# Patient Record
Sex: FEMALE | Race: WHITE | NOT HISPANIC OR LATINO | Employment: FULL TIME | URBAN - METROPOLITAN AREA
[De-identification: names, ages, dates, MRNs, and addresses within clinical notes are randomized per-mention and may not be internally consistent; named-entity substitution may affect disease eponyms.]

---

## 2020-09-02 ENCOUNTER — HISTORICAL (OUTPATIENT)
Dept: RADIOLOGY | Facility: HOSPITAL | Age: 30
End: 2020-09-02

## 2021-02-17 LAB
HUMAN PAPILLOMAVIRUS (HPV): NORMAL
PAP RECOMMENDATION EXT: NORMAL
PAP SMEAR: NORMAL

## 2021-11-01 ENCOUNTER — HISTORICAL (OUTPATIENT)
Dept: LAB | Facility: HOSPITAL | Age: 31
End: 2021-11-01

## 2022-02-07 LAB — C TRACH DNA SPEC QL NAA+PROBE: NEGATIVE

## 2022-04-07 ENCOUNTER — HISTORICAL (OUTPATIENT)
Dept: ADMINISTRATIVE | Facility: HOSPITAL | Age: 32
End: 2022-04-07
Payer: COMMERCIAL

## 2022-04-23 VITALS
SYSTOLIC BLOOD PRESSURE: 105 MMHG | OXYGEN SATURATION: 99 % | HEIGHT: 60 IN | WEIGHT: 111.75 LBS | DIASTOLIC BLOOD PRESSURE: 71 MMHG | BODY MASS INDEX: 21.94 KG/M2

## 2022-05-24 DIAGNOSIS — F50.9 EATING DISORDER, UNSPECIFIED TYPE: ICD-10-CM

## 2022-05-24 DIAGNOSIS — F33.9 RECURRENT MAJOR DEPRESSIVE DISORDER, REMISSION STATUS UNSPECIFIED: ICD-10-CM

## 2022-05-24 DIAGNOSIS — Z00.00 WELLNESS EXAMINATION: Primary | ICD-10-CM

## 2022-05-24 RX ORDER — SERTRALINE HYDROCHLORIDE 100 MG/1
200 TABLET, FILM COATED ORAL DAILY
COMMUNITY
Start: 2022-05-10 | End: 2022-06-13

## 2022-05-24 RX ORDER — TRAZODONE HYDROCHLORIDE 50 MG/1
50 TABLET ORAL NIGHTLY
COMMUNITY
Start: 2022-02-03 | End: 2022-06-01

## 2022-05-24 RX ORDER — METOPROLOL SUCCINATE 25 MG/1
25 TABLET, EXTENDED RELEASE ORAL EVERY MORNING
COMMUNITY
Start: 2022-03-16 | End: 2023-03-20 | Stop reason: SDUPTHER

## 2022-05-24 RX ORDER — VALACYCLOVIR HYDROCHLORIDE 1 G/1
1000 TABLET, FILM COATED ORAL 2 TIMES DAILY
COMMUNITY
Start: 2022-04-04 | End: 2022-06-01

## 2022-05-24 RX ORDER — LIDOCAINE 50 MG/G
OINTMENT TOPICAL 4 TIMES DAILY PRN
COMMUNITY
Start: 2022-04-03 | End: 2022-06-01

## 2022-05-31 ENCOUNTER — LAB VISIT (OUTPATIENT)
Dept: LAB | Facility: HOSPITAL | Age: 32
End: 2022-05-31
Attending: FAMILY MEDICINE
Payer: COMMERCIAL

## 2022-05-31 ENCOUNTER — TELEPHONE (OUTPATIENT)
Dept: FAMILY MEDICINE | Facility: CLINIC | Age: 32
End: 2022-05-31
Payer: COMMERCIAL

## 2022-05-31 DIAGNOSIS — F50.9 EATING DISORDER, UNSPECIFIED TYPE: ICD-10-CM

## 2022-05-31 DIAGNOSIS — F33.9 RECURRENT MAJOR DEPRESSIVE DISORDER, REMISSION STATUS UNSPECIFIED: ICD-10-CM

## 2022-05-31 DIAGNOSIS — Z00.00 WELLNESS EXAMINATION: ICD-10-CM

## 2022-05-31 LAB
ALBUMIN SERPL-MCNC: 4.4 GM/DL (ref 3.5–5)
ALBUMIN/GLOB SERPL: 1.4 RATIO (ref 1.1–2)
ALP SERPL-CCNC: 60 UNIT/L (ref 40–150)
ALT SERPL-CCNC: 18 UNIT/L (ref 0–55)
APPEARANCE UR: CLEAR
AST SERPL-CCNC: 23 UNIT/L (ref 5–34)
BACTERIA #/AREA URNS AUTO: NORMAL /HPF
BASOPHILS # BLD AUTO: 0.03 X10(3)/MCL (ref 0–0.2)
BASOPHILS NFR BLD AUTO: 0.6 %
BILIRUB UR QL STRIP.AUTO: NEGATIVE MG/DL
BILIRUBIN DIRECT+TOT PNL SERPL-MCNC: 0.6 MG/DL
BUN SERPL-MCNC: 11 MG/DL (ref 7–18.7)
CALCIUM SERPL-MCNC: 9.6 MG/DL (ref 8.4–10.2)
CHLORIDE SERPL-SCNC: 101 MMOL/L (ref 98–107)
CO2 SERPL-SCNC: 30 MMOL/L (ref 22–29)
COLOR UR AUTO: YELLOW
CREAT SERPL-MCNC: 0.71 MG/DL (ref 0.55–1.02)
DEPRECATED CALCIDIOL+CALCIFEROL SERPL-MC: 36.5 NG/ML (ref 30–80)
EOSINOPHIL # BLD AUTO: 0.09 X10(3)/MCL (ref 0–0.9)
EOSINOPHIL NFR BLD AUTO: 1.8 %
ERYTHROCYTE [DISTWIDTH] IN BLOOD BY AUTOMATED COUNT: 12.6 % (ref 11.5–17)
GLOBULIN SER-MCNC: 3.1 GM/DL (ref 2.4–3.5)
GLUCOSE SERPL-MCNC: 71 MG/DL (ref 74–100)
GLUCOSE UR QL STRIP.AUTO: NEGATIVE MG/DL
HCT VFR BLD AUTO: 40.7 % (ref 37–47)
HGB BLD-MCNC: 13.1 GM/DL (ref 12–16)
IMM GRANULOCYTES # BLD AUTO: 0.01 X10(3)/MCL (ref 0–0.02)
IMM GRANULOCYTES NFR BLD AUTO: 0.2 % (ref 0–0.43)
KETONES UR QL STRIP.AUTO: NEGATIVE MG/DL
LEUKOCYTE ESTERASE UR QL STRIP.AUTO: NEGATIVE UNIT/L
LYMPHOCYTES # BLD AUTO: 1.54 X10(3)/MCL (ref 0.6–4.6)
LYMPHOCYTES NFR BLD AUTO: 31.2 %
MCH RBC QN AUTO: 31.4 PG (ref 27–31)
MCHC RBC AUTO-ENTMCNC: 32.2 MG/DL (ref 33–36)
MCV RBC AUTO: 97.6 FL (ref 80–94)
MONOCYTES # BLD AUTO: 0.3 X10(3)/MCL (ref 0.1–1.3)
MONOCYTES NFR BLD AUTO: 6.1 %
NEUTROPHILS # BLD AUTO: 3 X10(3)/MCL (ref 2.1–9.2)
NEUTROPHILS NFR BLD AUTO: 60.1 %
NITRITE UR QL STRIP.AUTO: NEGATIVE
NRBC BLD AUTO-RTO: 0 %
PH UR STRIP.AUTO: 8 [PH]
PLATELET # BLD AUTO: 271 X10(3)/MCL (ref 130–400)
PMV BLD AUTO: 9.7 FL (ref 9.4–12.4)
POTASSIUM SERPL-SCNC: 4.4 MMOL/L (ref 3.5–5.1)
PROT SERPL-MCNC: 7.5 GM/DL (ref 6.4–8.3)
PROT UR QL STRIP.AUTO: NEGATIVE MG/DL
RBC # BLD AUTO: 4.17 X10(6)/MCL (ref 4.2–5.4)
RBC #/AREA URNS AUTO: NORMAL /HPF
RBC UR QL AUTO: NEGATIVE UNIT/L
SODIUM SERPL-SCNC: 139 MMOL/L (ref 136–145)
SP GR UR STRIP.AUTO: 1.01
SQUAMOUS #/AREA URNS AUTO: NORMAL /LPF
TSH SERPL-ACNC: 0.96 UIU/ML (ref 0.35–4.94)
UROBILINOGEN UR STRIP-ACNC: 0.2 MG/DL
WBC # SPEC AUTO: 4.9 X10(3)/MCL (ref 4.5–11.5)
WBC #/AREA URNS AUTO: NORMAL /HPF

## 2022-05-31 PROCEDURE — 80053 COMPREHEN METABOLIC PANEL: CPT

## 2022-05-31 PROCEDURE — 85025 COMPLETE CBC W/AUTO DIFF WBC: CPT

## 2022-05-31 PROCEDURE — 36415 COLL VENOUS BLD VENIPUNCTURE: CPT

## 2022-05-31 PROCEDURE — 82306 VITAMIN D 25 HYDROXY: CPT

## 2022-05-31 PROCEDURE — 81001 URINALYSIS AUTO W/SCOPE: CPT

## 2022-05-31 PROCEDURE — 84443 ASSAY THYROID STIM HORMONE: CPT

## 2022-06-01 ENCOUNTER — OFFICE VISIT (OUTPATIENT)
Dept: FAMILY MEDICINE | Facility: CLINIC | Age: 32
End: 2022-06-01
Payer: COMMERCIAL

## 2022-06-01 VITALS
SYSTOLIC BLOOD PRESSURE: 108 MMHG | DIASTOLIC BLOOD PRESSURE: 71 MMHG | OXYGEN SATURATION: 99 % | HEART RATE: 88 BPM | RESPIRATION RATE: 16 BRPM | BODY MASS INDEX: 21.51 KG/M2 | HEIGHT: 60 IN | TEMPERATURE: 98 F | WEIGHT: 109.56 LBS

## 2022-06-01 DIAGNOSIS — F33.40 RECURRENT MAJOR DEPRESSIVE DISORDER, IN REMISSION: ICD-10-CM

## 2022-06-01 DIAGNOSIS — F10.20 ALCOHOLISM: ICD-10-CM

## 2022-06-01 DIAGNOSIS — F41.9 ANXIETY DISORDER, UNSPECIFIED TYPE: ICD-10-CM

## 2022-06-01 DIAGNOSIS — F51.01 PRIMARY INSOMNIA: ICD-10-CM

## 2022-06-01 DIAGNOSIS — R00.0 TACHYCARDIA: ICD-10-CM

## 2022-06-01 DIAGNOSIS — F50.01 ANOREXIA NERVOSA, RESTRICTING TYPE: ICD-10-CM

## 2022-06-01 DIAGNOSIS — Z72.0 TOBACCO USER: Primary | ICD-10-CM

## 2022-06-01 PROBLEM — G47.00 INSOMNIA: Status: ACTIVE | Noted: 2022-06-01

## 2022-06-01 PROBLEM — K21.9 GASTROESOPHAGEAL REFLUX DISEASE: Status: ACTIVE | Noted: 2022-06-01

## 2022-06-01 PROBLEM — F33.9 RECURRENT MAJOR DEPRESSIVE DISORDER: Status: ACTIVE | Noted: 2022-06-01

## 2022-06-01 PROBLEM — K21.9 GASTROESOPHAGEAL REFLUX DISEASE: Status: RESOLVED | Noted: 2022-06-01 | Resolved: 2022-06-01

## 2022-06-01 PROBLEM — F50.9 DISORDERED EATING: Status: ACTIVE | Noted: 2022-06-01

## 2022-06-01 PROCEDURE — 3008F PR BODY MASS INDEX (BMI) DOCUMENTED: ICD-10-PCS | Mod: CPTII,,, | Performed by: FAMILY MEDICINE

## 2022-06-01 PROCEDURE — 1159F MED LIST DOCD IN RCRD: CPT | Mod: CPTII,,, | Performed by: FAMILY MEDICINE

## 2022-06-01 PROCEDURE — 3074F SYST BP LT 130 MM HG: CPT | Mod: CPTII,,, | Performed by: FAMILY MEDICINE

## 2022-06-01 PROCEDURE — 1160F PR REVIEW ALL MEDS BY PRESCRIBER/CLIN PHARMACIST DOCUMENTED: ICD-10-PCS | Mod: CPTII,,, | Performed by: FAMILY MEDICINE

## 2022-06-01 PROCEDURE — 1159F PR MEDICATION LIST DOCUMENTED IN MEDICAL RECORD: ICD-10-PCS | Mod: CPTII,,, | Performed by: FAMILY MEDICINE

## 2022-06-01 PROCEDURE — 3008F BODY MASS INDEX DOCD: CPT | Mod: CPTII,,, | Performed by: FAMILY MEDICINE

## 2022-06-01 PROCEDURE — 99395 PREV VISIT EST AGE 18-39: CPT | Mod: ,,, | Performed by: FAMILY MEDICINE

## 2022-06-01 PROCEDURE — 3078F DIAST BP <80 MM HG: CPT | Mod: CPTII,,, | Performed by: FAMILY MEDICINE

## 2022-06-01 PROCEDURE — 3074F PR MOST RECENT SYSTOLIC BLOOD PRESSURE < 130 MM HG: ICD-10-PCS | Mod: CPTII,,, | Performed by: FAMILY MEDICINE

## 2022-06-01 PROCEDURE — 3078F PR MOST RECENT DIASTOLIC BLOOD PRESSURE < 80 MM HG: ICD-10-PCS | Mod: CPTII,,, | Performed by: FAMILY MEDICINE

## 2022-06-01 PROCEDURE — 99395 PR PREVENTIVE VISIT,EST,18-39: ICD-10-PCS | Mod: ,,, | Performed by: FAMILY MEDICINE

## 2022-06-01 PROCEDURE — 1160F RVW MEDS BY RX/DR IN RCRD: CPT | Mod: CPTII,,, | Performed by: FAMILY MEDICINE

## 2022-06-01 NOTE — PROGRESS NOTES
Windy Galarza  06/01/2022  [id number]    Jhoana Lenz MD  Patient Care Team:  Jhoana Lu MD as PCP - General (Family Medicine)  Kallie Ambrose MD as Consulting Physician (Obstetrics and Gynecology)      Chief Complaint:  Chief Complaint   Patient presents with    Annual Exam       History of Present Illness:  HPI    31 y.o. female who presents today for wellness with labs. Labs reviewed by me and were discussed with patient. All questions regarding lab results were answered. She struggles with alcoholism and drinks 6 beers 3x a week. She exercises by walking daily. She has regular menstrual cycles.     Depression and anxiety: on zoloft 100 mg daily. Denies si/hi.  I have referred to psych    Eating disorder: she is in therapy for it but I have referred to psych for eating disorder, anxiety and depression    Tachycardia: well controlled with metoprolol    Papsmear: 2/21    Review of Systems  General: denies f/c, weight loss, night sweats, decreased appetite  Eye: denies blurred vision, changes in vision  Respiratory: denies sob, wheezing, cough  Cardiovascular: denies chest pain, palpitations, edema  Gastrointestinal: denies abdominal pain, n/v, constipation, diarrhea  Integumentary: denies rashes, pruritis        Health Maintenance  Health Maintenance Topics with due status: Not Due       Topic Last Completion Date    Influenza Vaccine Not Due     Health Maintenance Due   Topic Date Due    Hepatitis C Screening  Never done    Cervical Cancer Screening  Never done    Lipid Panel  Never done    COVID-19 Vaccine (1) Never done    Pneumococcal Vaccines (Age 0-64) (1 - PCV) Never done    HIV Screening  Never done    TETANUS VACCINE  07/24/2010       Exam:  Vitals:    06/01/22 0730   BP: 108/71   Pulse: 88   Resp: 16   Temp: 97.7 °F (36.5 °C)     Weight: 49.7 kg (109 lb 9.1 oz)   Body mass index is 21.23 kg/m².      Physical Exam    Constitutional: NAD, alert, pleasant  Respiratory: CTAB, no  wheezes, rales or rhonchi. No accessory muscle use  Eyes: EOMI  Cardiovascular: RRR, No m/r/g. No JVD. No LE edema  Gastrointestinal: BS+, nontender, nondistended  Integumentary: warm, dry, intact  Psych: AA&Ox3      1. Tobacco user  Overview:  She has quit before cold turkey but accepts a referral to smoking cessation.     Assessment & Plan:  Refer for smoking cessation    Orders:  -     Ambulatory referral/consult to Smoking Cessation Program    2. Anxiety disorder, unspecified type  Overview:  On zoloft 200 mg daily but I have referred to psych. She has failed effexor and klonopin in the past    Assessment & Plan:  Continue klonopin      3. Tachycardia  Overview:  Well controlled with metoprolol    Assessment & Plan:  Continue metoprolol      4. Primary insomnia  Overview:  She practices good sleep hygiene. She has failed trazodone and vistaril.     Assessment & Plan:  Continue good sleep hygiene      5. Anorexia nervosa, restricting type  Overview:  In therapy and doing well. Eating 3 meals per day    Assessment & Plan:   Continue therapy as it seems to really be helping her      6. Recurrent major depressive disorder, in remission  Overview:  Patient is doing well on zoloft 100 mg daily. She could not tolerate the 200 mg tabs. She has failed effexor as well. I did recommend she start seeing psych but financially, she cannot afford to do so at this time. She denies si/hi.     Assessment & Plan:  Continue zoloft 100 mg daily. RTC 6 mts or sooner  Call me with any si/hi or go to ER      7. Alcoholism  Overview:  Patient is drinking heavier lately. I have advised patient that she quit drinking alcohol or at least cut back considerably. She understood    Assessment & Plan:  Alcohol cessation encouraged       Exercise for a total of 150 min per week and eat a healthy diet  Stay up to date with all cancer screening discussed in visit  Immunizations due were discussed during visit  All health maintenance was reviewed  with patient. Patient verbalized understanding. All questions were answered.   Follow up: Follow up in about 6 months (around 12/1/2022) for routine follow up.      Care Plan/Goals: Reviewed   Goals    None

## 2022-08-22 NOTE — PROGRESS NOTES
The patient location is: work  The chief complaint leading to consultation is: anxiety    Visit type: audiovisual    Face to Face time with patient: 8  11 minutes of total time spent on the encounter, which includes face to face time and non-face to face time preparing to see the patient (eg, review of tests), Obtaining and/or reviewing separately obtained history, Documenting clinical information in the electronic or other health record, Independently interpreting results (not separately reported) and communicating results to the patient/family/caregiver, or Care coordination (not separately reported).         Each patient to whom he or she provides medical services by telemedicine is:  (1) informed of the relationship between the physician and patient and the respective role of any other health care provider with respect to management of the patient; and (2) notified that he or she may decline to receive medical services by telemedicine and may withdraw from such care at any time.       Patient ID: [id number]     Chief Complaint: Anxiety        HPI:     Windy Galarza is a 31 y.o. female addressed via telemed for anxiety, panic attacks  Is on zoloft 100mg for anxiety but takes klonopin prn panic attacks but rx for klonopin sent one year ago is almost out. Discussed that we do not prescribe klonopin for chronic use, prn only. She voices understanding       Depression and anxiety: on zoloft 100 mg daily. Denies si/hi.       Eating disorder: she is in therapy for it but I have referred to psych for eating disorder, anxiety and depression     Tachycardia: well controlled with metoprolol     Papsmear: 2/21       6. Recurrent major depressive disorder, in remission  Overview:  Patient is doing well on zoloft 100 mg daily. She could not tolerate the 200 mg tabs. She has failed effexor as well. I did recommend she start seeing psych but financially, she cannot afford to do so at this time. She denies si/hi.     cvbs biswas      ----------------------------  Anxiety disorder, unspecified  Eating disorder, unspecified  GERD (gastroesophageal reflux disease)  IBS (irritable bowel syndrome)  Insomnia  Major depression, recurrent  Tachycardia     Past Surgical History:   Procedure Laterality Date    DILATION AND CURETTAGE OF UTERUS  01/28/2021    WISDOM TOOTH EXTRACTION  2016       Review of patient's allergies indicates:  No Known Allergies    Outpatient Medications Marked as Taking for the 8/23/22 encounter (Clinical Support) with NURSE PRACTITIONER, [facility]   Medication Sig Dispense Refill    metoprolol succinate (TOPROL-XL) 25 MG 24 hr tablet Take 25 mg by mouth every morning.      sertraline (ZOLOFT) 100 MG tablet TAKE 2 TABLETS BY MOUTH EVERY DAY 60 tablet 2    traZODone (DESYREL) 50 MG tablet Take 50 mg by mouth.      valACYclovir (VALTREX) 1000 MG tablet Take 1,000 mg by mouth once daily.         Social History     Socioeconomic History    Marital status: Single   Occupational History    Occupation:  - [employer]   Tobacco Use    Smoking status: Current Some Day Smoker     Packs/day: 0.25     Types: Cigarettes    Smokeless tobacco: Never Used   Substance and Sexual Activity    Alcohol use: Not Currently    Drug use: Never    Sexual activity: Not Currently     Partners: Male     Birth control/protection: Abstinence        Family History   Problem Relation Age of Onset    Heart disease Mother     Diabetes Mellitus Mother     Hypertension Mother     Tuberculosis Mother     Alcohol abuse Mother     Depression Mother     Mental illness Mother     Miscarriages / Stillbirths Mother     Thyroid disease Sister     Alcohol abuse Sister     Depression Sister     Heart attack Maternal Grandmother     Hypertension Maternal Grandmother     Stroke Maternal Grandmother     Coronary artery disease Maternal Grandmother     Alcohol abuse Maternal Grandmother     Cancer Maternal Grandmother      "Depression Maternal Grandmother     Heart disease Maternal Grandfather     Hypertension Maternal Grandfather     Alcohol abuse Maternal Grandfather     Depression Maternal Grandfather     Hypertension Paternal Grandmother     Alcohol abuse Paternal Grandmother     Depression Paternal Grandmother     Heart disease Paternal Grandmother     Dementia Paternal Grandfather     Hypertension Paternal Grandfather     Depression Paternal Grandfather     Hearing loss Paternal Grandfather     Vision loss Paternal Grandfather     Alcohol abuse Father     Depression Father         Subjective:     ROS      See HPI for details    Constitutional: Denies Change in appetite. Denies Chills. Denies Fever. Denies Night sweats.  Psychiatric: Denies Depression. Denies Anxiety. Denies Suicidal/Homicidal ideations.    All Other ROS: Negative except as stated in HPI.       Objective:     Ht 5' 0.24" (1.53 m)   Wt 51.7 kg (114 lb)   LMP 08/18/2022 (Exact Date)   BMI 22.09 kg/m²     Physical Exam    General: Alert and oriented, No acute distress.  Head: Normocephalic,  Psychiatric: Normal interaction,  Appropriate affect         Assessment:       ICD-10-CM ICD-9-CM   1. HERVE (generalized anxiety disorder)  F41.1 300.02   2. Panic attacks  F41.0 300.01        Plan:     1. HERVE (generalized anxiety disorder)    2. Panic attacks    Continue zoloft, klonopin prn  If having to take klonopin more than twice a week then she is to call us about changing zoloft to something else for better control of anxiety       Medication List with Changes/Refills   New Medications    CLONAZEPAM (KLONOPIN) 1 MG TABLET    Take 1 tablet (1 mg total) by mouth 2 (two) times daily as needed for Anxiety.       Start Date: 8/23/2022 End Date: 8/23/2023   Current Medications    METOPROLOL SUCCINATE (TOPROL-XL) 25 MG 24 HR TABLET    Take 25 mg by mouth every morning.       Start Date: 3/16/2022 End Date: --    SERTRALINE (ZOLOFT) 100 MG TABLET    TAKE 2 " TABLETS BY MOUTH EVERY DAY       Start Date: 6/13/2022 End Date: --    TRAZODONE (DESYREL) 50 MG TABLET    Take 50 mg by mouth.       Start Date: 12/3/2021 End Date: --    VALACYCLOVIR (VALTREX) 1000 MG TABLET    Take 1,000 mg by mouth once daily.       Start Date: 8/8/2022  End Date: --          Follow up if symptoms worsen or fail to improve.

## 2022-08-23 ENCOUNTER — CLINICAL SUPPORT (OUTPATIENT)
Dept: FAMILY MEDICINE | Facility: CLINIC | Age: 32
End: 2022-08-23
Payer: COMMERCIAL

## 2022-08-23 ENCOUNTER — DOCUMENTATION ONLY (OUTPATIENT)
Dept: FAMILY MEDICINE | Facility: CLINIC | Age: 32
End: 2022-08-23

## 2022-08-23 VITALS — BODY MASS INDEX: 22.38 KG/M2 | WEIGHT: 114 LBS | HEIGHT: 60 IN

## 2022-08-23 DIAGNOSIS — F41.0 PANIC ATTACKS: ICD-10-CM

## 2022-08-23 DIAGNOSIS — F41.1 GAD (GENERALIZED ANXIETY DISORDER): Primary | ICD-10-CM

## 2022-08-23 PROCEDURE — 99442 PR PHYSICIAN TELEPHONE EVALUATION 11-20 MIN: CPT | Mod: 95,,, | Performed by: NURSE PRACTITIONER

## 2022-08-23 PROCEDURE — 99442 PR PHYSICIAN TELEPHONE EVALUATION 11-20 MIN: ICD-10-PCS | Mod: 95,,, | Performed by: NURSE PRACTITIONER

## 2022-08-23 RX ORDER — TRAZODONE HYDROCHLORIDE 50 MG/1
50 TABLET ORAL
COMMUNITY
Start: 2021-12-03 | End: 2022-11-15

## 2022-08-23 RX ORDER — VALACYCLOVIR HYDROCHLORIDE 1 G/1
1000 TABLET, FILM COATED ORAL DAILY
COMMUNITY
Start: 2022-08-08

## 2022-08-23 RX ORDER — CLONAZEPAM 1 MG/1
1 TABLET ORAL 2 TIMES DAILY PRN
Qty: 60 TABLET | Refills: 0 | Status: SHIPPED | OUTPATIENT
Start: 2022-08-23 | End: 2022-12-27 | Stop reason: SDUPTHER

## 2022-11-15 ENCOUNTER — OFFICE VISIT (OUTPATIENT)
Dept: FAMILY MEDICINE | Facility: CLINIC | Age: 32
End: 2022-11-15
Payer: COMMERCIAL

## 2022-11-15 VITALS
HEART RATE: 90 BPM | OXYGEN SATURATION: 98 % | TEMPERATURE: 97 F | BODY MASS INDEX: 22.81 KG/M2 | HEIGHT: 60 IN | WEIGHT: 116.19 LBS | DIASTOLIC BLOOD PRESSURE: 72 MMHG | RESPIRATION RATE: 18 BRPM | SYSTOLIC BLOOD PRESSURE: 111 MMHG

## 2022-11-15 DIAGNOSIS — F41.1 GENERALIZED ANXIETY DISORDER: Primary | ICD-10-CM

## 2022-11-15 DIAGNOSIS — F33.40 RECURRENT MAJOR DEPRESSIVE DISORDER, IN REMISSION: ICD-10-CM

## 2022-11-15 PROBLEM — K58.9 IRRITABLE BOWEL SYNDROME: Status: ACTIVE | Noted: 2022-11-15

## 2022-11-15 PROCEDURE — 3008F PR BODY MASS INDEX (BMI) DOCUMENTED: ICD-10-PCS | Mod: CPTII,,, | Performed by: FAMILY MEDICINE

## 2022-11-15 PROCEDURE — 3074F PR MOST RECENT SYSTOLIC BLOOD PRESSURE < 130 MM HG: ICD-10-PCS | Mod: CPTII,,, | Performed by: FAMILY MEDICINE

## 2022-11-15 PROCEDURE — 3078F DIAST BP <80 MM HG: CPT | Mod: CPTII,,, | Performed by: FAMILY MEDICINE

## 2022-11-15 PROCEDURE — 3074F SYST BP LT 130 MM HG: CPT | Mod: CPTII,,, | Performed by: FAMILY MEDICINE

## 2022-11-15 PROCEDURE — 1159F MED LIST DOCD IN RCRD: CPT | Mod: CPTII,,, | Performed by: FAMILY MEDICINE

## 2022-11-15 PROCEDURE — 1160F RVW MEDS BY RX/DR IN RCRD: CPT | Mod: CPTII,,, | Performed by: FAMILY MEDICINE

## 2022-11-15 PROCEDURE — 1160F PR REVIEW ALL MEDS BY PRESCRIBER/CLIN PHARMACIST DOCUMENTED: ICD-10-PCS | Mod: CPTII,,, | Performed by: FAMILY MEDICINE

## 2022-11-15 PROCEDURE — 3008F BODY MASS INDEX DOCD: CPT | Mod: CPTII,,, | Performed by: FAMILY MEDICINE

## 2022-11-15 PROCEDURE — 99213 OFFICE O/P EST LOW 20 MIN: CPT | Mod: ,,, | Performed by: FAMILY MEDICINE

## 2022-11-15 PROCEDURE — 99213 PR OFFICE/OUTPT VISIT, EST, LEVL III, 20-29 MIN: ICD-10-PCS | Mod: ,,, | Performed by: FAMILY MEDICINE

## 2022-11-15 PROCEDURE — 1159F PR MEDICATION LIST DOCUMENTED IN MEDICAL RECORD: ICD-10-PCS | Mod: CPTII,,, | Performed by: FAMILY MEDICINE

## 2022-11-15 PROCEDURE — 3078F PR MOST RECENT DIASTOLIC BLOOD PRESSURE < 80 MM HG: ICD-10-PCS | Mod: CPTII,,, | Performed by: FAMILY MEDICINE

## 2022-11-15 RX ORDER — ESCITALOPRAM OXALATE 10 MG/1
10 TABLET ORAL DAILY
Qty: 30 TABLET | Refills: 11 | Status: SHIPPED | OUTPATIENT
Start: 2022-11-15 | End: 2022-12-20

## 2022-11-15 NOTE — PROGRESS NOTES
Patient ID: [id number]     Chief Complaint: Memory Loss and Dizziness        HPI:     Windy Galarza is a 31 y.o. female here today for Memory Loss and Dizziness. Has been having worsening memory loss for 3-4 months. Forgetting why she entered a room, forgetting appointments. Also experiencing lack of drive. Tried Effexor but had tremors.     Stressors at home include parents. Been going to therapy. Learning to set boundaries. Work is good and doesn't associate that as a source of stress in her life. Takes Klonopin PRN for anxiety/panic attacks.     Cut back her alcohol consumption considerably. Drinks maybe once per week. Drinks non-alcohol beer other times when socializing.         ----------------------------  Anxiety disorder, unspecified  Eating disorder, unspecified  GERD (gastroesophageal reflux disease)  IBS (irritable bowel syndrome)  Insomnia  Major depression, recurrent  Tachycardia     Past Surgical History:   Procedure Laterality Date    DILATION AND CURETTAGE OF UTERUS  01/28/2021    WISDOM TOOTH EXTRACTION  2016       Review of patient's allergies indicates:  No Known Allergies    Outpatient Medications Marked as Taking for the 11/15/22 encounter (Office Visit) with Jann Grajeda, DO   Medication Sig Dispense Refill    clonazePAM (KLONOPIN) 1 MG tablet Take 1 tablet (1 mg total) by mouth 2 (two) times daily as needed for Anxiety. 60 tablet 0    metoprolol succinate (TOPROL-XL) 25 MG 24 hr tablet Take 25 mg by mouth every morning.      valACYclovir (VALTREX) 1000 MG tablet Take 1,000 mg by mouth once daily.      [DISCONTINUED] sertraline (ZOLOFT) 100 MG tablet TAKE 2 TABLETS BY MOUTH EVERY DAY 60 tablet 0       Social History     Socioeconomic History    Marital status: Single   Occupational History    Occupation:  - [employer]   Tobacco Use    Smoking status: Some Days     Packs/day: 0.25     Types: Cigarettes    Smokeless tobacco: Never   Substance and Sexual Activity    Alcohol use:  Not Currently    Drug use: Never    Sexual activity: Not Currently     Partners: Male     Birth control/protection: Abstinence        Family History   Problem Relation Age of Onset    Heart disease Mother     Diabetes Mellitus Mother     Hypertension Mother     Tuberculosis Mother     Alcohol abuse Mother     Depression Mother     Mental illness Mother     Miscarriages / Stillbirths Mother     Thyroid disease Sister     Alcohol abuse Sister     Depression Sister     Heart attack Maternal Grandmother     Hypertension Maternal Grandmother     Stroke Maternal Grandmother     Coronary artery disease Maternal Grandmother     Alcohol abuse Maternal Grandmother     Cancer Maternal Grandmother     Depression Maternal Grandmother     Heart disease Maternal Grandfather     Hypertension Maternal Grandfather     Alcohol abuse Maternal Grandfather     Depression Maternal Grandfather     Hypertension Paternal Grandmother     Alcohol abuse Paternal Grandmother     Depression Paternal Grandmother     Heart disease Paternal Grandmother     Dementia Paternal Grandfather     Hypertension Paternal Grandfather     Depression Paternal Grandfather     Hearing loss Paternal Grandfather     Vision loss Paternal Grandfather     Alcohol abuse Father     Depression Father         Patient Care Team:  Jann Grajeda DO as PCP - General (Family Medicine)  Kallie Ambrose MD as Consulting Physician (Obstetrics and Gynecology)     Subjective:     Review of Systems   Constitutional:  Positive for diaphoresis. Negative for chills and fever.   Respiratory:  Negative for cough and shortness of breath.    Cardiovascular:  Positive for palpitations. Negative for chest pain.   Gastrointestinal:  Positive for diarrhea and heartburn. Negative for abdominal pain and constipation.   Musculoskeletal:  Negative for back pain and myalgias.   Neurological:  Positive for dizziness, tingling and headaches.   Psychiatric/Behavioral:  Positive for depression and  "memory loss. Negative for suicidal ideas. The patient is nervous/anxious and has insomnia.      See HPI for details  All Other ROS: Negative except as stated in HPI.       Objective:     /72   Pulse 90   Temp 97.2 °F (36.2 °C) (Tympanic)   Resp 18   Ht 5' 0.24" (1.53 m)   Wt 52.7 kg (116 lb 3.2 oz)   LMP 10/17/2022   SpO2 98%   BMI 22.52 kg/m²     Physical Exam  Vitals reviewed.   Constitutional:       General: She is not in acute distress.     Appearance: Normal appearance.   Cardiovascular:      Rate and Rhythm: Normal rate and regular rhythm.      Heart sounds: No murmur heard.    No friction rub. No gallop.   Pulmonary:      Effort: No respiratory distress.      Breath sounds: No wheezing, rhonchi or rales.   Skin:     General: Skin is warm and dry.      Findings: No lesion or rash.   Neurological:      General: No focal deficit present.      Mental Status: She is alert.   Psychiatric:         Mood and Affect: Mood normal.       Assessment/Plan:     1. Generalized anxiety disorder  -     EScitalopram oxalate (LEXAPRO) 10 MG tablet; Take 1 tablet (10 mg total) by mouth once daily.  Dispense: 30 tablet; Refill: 11  -Will try patient on Lexapro. Advised patient to switch to Lexapro from Zoloft at next scheduled dose. Advised her to not take both medications together.   2. Recurrent major depressive disorder, in remission       Educated patient on the risks of serotonin based medications such as serotonin modulators and SSRIs/SNRIs.   Risks discussed include but were not limited to common side effects of the specific medications, risk for worsening symptoms of depression including development of suicidal thoughts or ideations, and serotonin syndrome.   Counseled patient on expected time course of treatment plan including potential benefits of medication not becoming noticeable until up to 6 weeks from start date. Patient voiced understanding of the risks and plan.        Follow up:     Follow up in " about 4 weeks (around 12/13/2022) for Follow up. In addition to their scheduled follow up, the patient has also been instructed to follow up on as needed basis.

## 2022-12-20 ENCOUNTER — OFFICE VISIT (OUTPATIENT)
Dept: FAMILY MEDICINE | Facility: CLINIC | Age: 32
End: 2022-12-20
Payer: COMMERCIAL

## 2022-12-20 VITALS
RESPIRATION RATE: 18 BRPM | SYSTOLIC BLOOD PRESSURE: 100 MMHG | WEIGHT: 118.38 LBS | OXYGEN SATURATION: 99 % | DIASTOLIC BLOOD PRESSURE: 64 MMHG | BODY MASS INDEX: 23.24 KG/M2 | HEART RATE: 92 BPM | HEIGHT: 60 IN | TEMPERATURE: 97 F

## 2022-12-20 DIAGNOSIS — B07.9 WART OF HAND: ICD-10-CM

## 2022-12-20 DIAGNOSIS — F41.1 GENERALIZED ANXIETY DISORDER: Primary | ICD-10-CM

## 2022-12-20 PROCEDURE — 3078F DIAST BP <80 MM HG: CPT | Mod: CPTII,,, | Performed by: FAMILY MEDICINE

## 2022-12-20 PROCEDURE — 1160F PR REVIEW ALL MEDS BY PRESCRIBER/CLIN PHARMACIST DOCUMENTED: ICD-10-PCS | Mod: CPTII,,, | Performed by: FAMILY MEDICINE

## 2022-12-20 PROCEDURE — 17110 PR DESTRUCTION BENIGN LESIONS UP TO 14: ICD-10-PCS | Mod: ,,, | Performed by: FAMILY MEDICINE

## 2022-12-20 PROCEDURE — 99213 OFFICE O/P EST LOW 20 MIN: CPT | Mod: 25,,, | Performed by: FAMILY MEDICINE

## 2022-12-20 PROCEDURE — 1159F MED LIST DOCD IN RCRD: CPT | Mod: CPTII,,, | Performed by: FAMILY MEDICINE

## 2022-12-20 PROCEDURE — 3008F BODY MASS INDEX DOCD: CPT | Mod: CPTII,,, | Performed by: FAMILY MEDICINE

## 2022-12-20 PROCEDURE — 1160F RVW MEDS BY RX/DR IN RCRD: CPT | Mod: CPTII,,, | Performed by: FAMILY MEDICINE

## 2022-12-20 PROCEDURE — 3008F PR BODY MASS INDEX (BMI) DOCUMENTED: ICD-10-PCS | Mod: CPTII,,, | Performed by: FAMILY MEDICINE

## 2022-12-20 PROCEDURE — 99213 PR OFFICE/OUTPT VISIT, EST, LEVL III, 20-29 MIN: ICD-10-PCS | Mod: 25,,, | Performed by: FAMILY MEDICINE

## 2022-12-20 PROCEDURE — 3078F PR MOST RECENT DIASTOLIC BLOOD PRESSURE < 80 MM HG: ICD-10-PCS | Mod: CPTII,,, | Performed by: FAMILY MEDICINE

## 2022-12-20 PROCEDURE — 1159F PR MEDICATION LIST DOCUMENTED IN MEDICAL RECORD: ICD-10-PCS | Mod: CPTII,,, | Performed by: FAMILY MEDICINE

## 2022-12-20 PROCEDURE — 3074F PR MOST RECENT SYSTOLIC BLOOD PRESSURE < 130 MM HG: ICD-10-PCS | Mod: CPTII,,, | Performed by: FAMILY MEDICINE

## 2022-12-20 PROCEDURE — 17110 DESTRUCTION B9 LES UP TO 14: CPT | Mod: ,,, | Performed by: FAMILY MEDICINE

## 2022-12-20 PROCEDURE — 3074F SYST BP LT 130 MM HG: CPT | Mod: CPTII,,, | Performed by: FAMILY MEDICINE

## 2022-12-20 NOTE — PROGRESS NOTES
Patient ID: [id number]     Chief Complaint: Follow-up        HPI:     Windy Galarza is a 31 y.o. female here today for Follow-up for anxiety and depression. Did not tolerate the Lexapro and stopped taking it.     GAD7 is a 16.     Stopped seeing her therapist as the therapist cancelled several appointments.   Stressors continue to be mainly at home. Of note, he grandmother is very ill currently.       ----------------------------  Anxiety disorder, unspecified  Eating disorder, unspecified  GERD (gastroesophageal reflux disease)  IBS (irritable bowel syndrome)  Insomnia  Major depression, recurrent  Tachycardia     Past Surgical History:   Procedure Laterality Date    DILATION AND CURETTAGE OF UTERUS  01/28/2021    WISDOM TOOTH EXTRACTION  2016       Review of patient's allergies indicates:  No Known Allergies    Outpatient Medications Marked as Taking for the 12/20/22 encounter (Office Visit) with Jann Grajeda, DO   Medication Sig Dispense Refill    clonazePAM (KLONOPIN) 1 MG tablet Take 1 tablet (1 mg total) by mouth 2 (two) times daily as needed for Anxiety. 60 tablet 0    metoprolol succinate (TOPROL-XL) 25 MG 24 hr tablet Take 25 mg by mouth every morning.      valACYclovir (VALTREX) 1000 MG tablet Take 1,000 mg by mouth once daily.         Social History     Socioeconomic History    Marital status: Single   Occupational History    Occupation:  - [employer]   Tobacco Use    Smoking status: Some Days     Packs/day: 0.25     Types: Cigarettes    Smokeless tobacco: Never   Substance and Sexual Activity    Alcohol use: Not Currently    Drug use: Never    Sexual activity: Not Currently     Partners: Male     Birth control/protection: Abstinence        Family History   Problem Relation Age of Onset    Heart disease Mother     Diabetes Mellitus Mother     Hypertension Mother     Tuberculosis Mother     Alcohol abuse Mother     Depression Mother     Mental illness Mother     Miscarriages / Stillbirths  "Mother     Thyroid disease Sister     Alcohol abuse Sister     Depression Sister     Heart attack Maternal Grandmother     Hypertension Maternal Grandmother     Stroke Maternal Grandmother     Coronary artery disease Maternal Grandmother     Alcohol abuse Maternal Grandmother     Cancer Maternal Grandmother     Depression Maternal Grandmother     Heart disease Maternal Grandfather     Hypertension Maternal Grandfather     Alcohol abuse Maternal Grandfather     Depression Maternal Grandfather     Hypertension Paternal Grandmother     Alcohol abuse Paternal Grandmother     Depression Paternal Grandmother     Heart disease Paternal Grandmother     Dementia Paternal Grandfather     Hypertension Paternal Grandfather     Depression Paternal Grandfather     Hearing loss Paternal Grandfather     Vision loss Paternal Grandfather     Alcohol abuse Father     Depression Father         Patient Care Team:  Jann Grajeda DO as PCP - General (Family Medicine)  Kallie Ambrose MD as Consulting Physician (Obstetrics and Gynecology)     Subjective:     Review of Systems   Constitutional:  Negative for chills and fever.   Respiratory:  Negative for shortness of breath.    Cardiovascular:  Negative for chest pain.   Neurological:  Negative for dizziness and headaches.   Psychiatric/Behavioral:  Positive for depression. Negative for memory loss. The patient is nervous/anxious. The patient does not have insomnia.      See HPI for details  All Other ROS: Negative except as stated in HPI.       Objective:     /64   Pulse 92   Temp 97.3 °F (36.3 °C) (Tympanic)   Resp 18   Ht 5' 0.24" (1.53 m)   Wt 53.7 kg (118 lb 6.4 oz)   LMP 12/18/2022   SpO2 99%   BMI 22.94 kg/m²     Physical Exam  Vitals reviewed.   Constitutional:       General: She is not in acute distress.     Appearance: Normal appearance.   Cardiovascular:      Rate and Rhythm: Normal rate and regular rhythm.      Heart sounds: No murmur heard.    No friction " rub. No gallop.   Pulmonary:      Effort: No respiratory distress.      Breath sounds: No wheezing, rhonchi or rales.   Musculoskeletal:         General: No swelling, tenderness or deformity.      Right lower leg: No edema.      Left lower leg: No edema.   Skin:     General: Skin is warm and dry.      Findings: Lesion present. No rash.   Neurological:      General: No focal deficit present.      Mental Status: She is alert.   Psychiatric:         Mood and Affect: Mood normal.       Assessment/Plan:     1. Generalized anxiety disorder  -Patient declined to try any new medications at this time. Encouraged her to look for constructive methods to manage it and not destructive methods. Encouraged her to try to find a new therapist also. Will reassess in 3 months or sooner if patient needs.   2. Wart of hand      -Cryodestruction of wart on hand. Written consent obtained. Patient tolerated procedure well. Finger dressed with bandaid.     Follow up:     Follow up in about 3 months (around 3/20/2023) for Follow up. In addition to their scheduled follow up, the patient has also been instructed to follow up on as needed basis.

## 2022-12-27 DIAGNOSIS — F41.1 GENERALIZED ANXIETY DISORDER: Primary | ICD-10-CM

## 2022-12-27 RX ORDER — CLONAZEPAM 1 MG/1
1 TABLET ORAL 2 TIMES DAILY PRN
Qty: 60 TABLET | Refills: 0 | Status: SHIPPED | OUTPATIENT
Start: 2022-12-27 | End: 2023-03-20 | Stop reason: SDUPTHER

## 2023-03-20 ENCOUNTER — OFFICE VISIT (OUTPATIENT)
Dept: FAMILY MEDICINE | Facility: CLINIC | Age: 33
End: 2023-03-20
Payer: COMMERCIAL

## 2023-03-20 VITALS
SYSTOLIC BLOOD PRESSURE: 111 MMHG | OXYGEN SATURATION: 99 % | WEIGHT: 116 LBS | TEMPERATURE: 98 F | HEIGHT: 60 IN | BODY MASS INDEX: 22.78 KG/M2 | DIASTOLIC BLOOD PRESSURE: 78 MMHG | RESPIRATION RATE: 16 BRPM | HEART RATE: 71 BPM

## 2023-03-20 DIAGNOSIS — D22.9 ENLARGED SKIN MOLE: ICD-10-CM

## 2023-03-20 DIAGNOSIS — R00.0 TACHYCARDIA: ICD-10-CM

## 2023-03-20 DIAGNOSIS — F41.1 GENERALIZED ANXIETY DISORDER: Primary | ICD-10-CM

## 2023-03-20 PROCEDURE — 3074F PR MOST RECENT SYSTOLIC BLOOD PRESSURE < 130 MM HG: ICD-10-PCS | Mod: CPTII,,, | Performed by: FAMILY MEDICINE

## 2023-03-20 PROCEDURE — 3078F PR MOST RECENT DIASTOLIC BLOOD PRESSURE < 80 MM HG: ICD-10-PCS | Mod: CPTII,,, | Performed by: FAMILY MEDICINE

## 2023-03-20 PROCEDURE — 99214 OFFICE O/P EST MOD 30 MIN: CPT | Mod: ,,, | Performed by: FAMILY MEDICINE

## 2023-03-20 PROCEDURE — 1159F PR MEDICATION LIST DOCUMENTED IN MEDICAL RECORD: ICD-10-PCS | Mod: CPTII,,, | Performed by: FAMILY MEDICINE

## 2023-03-20 PROCEDURE — 99214 PR OFFICE/OUTPT VISIT, EST, LEVL IV, 30-39 MIN: ICD-10-PCS | Mod: ,,, | Performed by: FAMILY MEDICINE

## 2023-03-20 PROCEDURE — 1160F RVW MEDS BY RX/DR IN RCRD: CPT | Mod: CPTII,,, | Performed by: FAMILY MEDICINE

## 2023-03-20 PROCEDURE — 3074F SYST BP LT 130 MM HG: CPT | Mod: CPTII,,, | Performed by: FAMILY MEDICINE

## 2023-03-20 PROCEDURE — 1159F MED LIST DOCD IN RCRD: CPT | Mod: CPTII,,, | Performed by: FAMILY MEDICINE

## 2023-03-20 PROCEDURE — 3008F PR BODY MASS INDEX (BMI) DOCUMENTED: ICD-10-PCS | Mod: CPTII,,, | Performed by: FAMILY MEDICINE

## 2023-03-20 PROCEDURE — 1160F PR REVIEW ALL MEDS BY PRESCRIBER/CLIN PHARMACIST DOCUMENTED: ICD-10-PCS | Mod: CPTII,,, | Performed by: FAMILY MEDICINE

## 2023-03-20 PROCEDURE — 3078F DIAST BP <80 MM HG: CPT | Mod: CPTII,,, | Performed by: FAMILY MEDICINE

## 2023-03-20 PROCEDURE — 3008F BODY MASS INDEX DOCD: CPT | Mod: CPTII,,, | Performed by: FAMILY MEDICINE

## 2023-03-20 RX ORDER — CLONAZEPAM 1 MG/1
1 TABLET ORAL 2 TIMES DAILY PRN
Qty: 60 TABLET | Refills: 5 | Status: SHIPPED | OUTPATIENT
Start: 2023-03-20 | End: 2024-01-11

## 2023-03-20 RX ORDER — METOPROLOL SUCCINATE 25 MG/1
25 TABLET, EXTENDED RELEASE ORAL EVERY MORNING
Qty: 30 TABLET | Refills: 5 | Status: SHIPPED | OUTPATIENT
Start: 2023-03-20 | End: 2023-09-13

## 2023-03-20 RX ORDER — SERTRALINE HYDROCHLORIDE 50 MG/1
50 TABLET, FILM COATED ORAL DAILY
Qty: 30 TABLET | Refills: 11 | Status: SHIPPED | OUTPATIENT
Start: 2023-03-20 | End: 2024-01-25

## 2023-03-20 NOTE — PROGRESS NOTES
Patient ID: [id number]     Chief Complaint: Follow-up        HPI:     Windy Galarza is a 32 y.o. female here today for Follow-up. Doing well overall today.       ----------------------------  Anxiety disorder, unspecified  Eating disorder, unspecified  GERD (gastroesophageal reflux disease)  IBS (irritable bowel syndrome)  Insomnia  Major depression, recurrent  Tachycardia     Past Surgical History:   Procedure Laterality Date    DILATION AND CURETTAGE OF UTERUS  01/28/2021    WISDOM TOOTH EXTRACTION  2016       Review of patient's allergies indicates:  No Known Allergies    Outpatient Medications Marked as Taking for the 3/20/23 encounter (Office Visit) with Jann Grajeda, DO   Medication Sig Dispense Refill    valACYclovir (VALTREX) 1000 MG tablet Take 1,000 mg by mouth once daily.      [DISCONTINUED] clonazePAM (KLONOPIN) 1 MG tablet Take 1 tablet (1 mg total) by mouth 2 (two) times daily as needed for Anxiety. 60 tablet 0       Social History     Socioeconomic History    Marital status: Single   Occupational History    Occupation:  - [employer]   Tobacco Use    Smoking status: Some Days     Packs/day: 0.25     Types: Cigarettes    Smokeless tobacco: Never   Substance and Sexual Activity    Alcohol use: Not Currently    Drug use: Never    Sexual activity: Not Currently     Partners: Male     Birth control/protection: Abstinence        Family History   Problem Relation Age of Onset    Heart disease Mother     Diabetes Mellitus Mother     Hypertension Mother     Tuberculosis Mother     Alcohol abuse Mother     Depression Mother     Mental illness Mother     Miscarriages / Stillbirths Mother     Thyroid disease Sister     Alcohol abuse Sister     Depression Sister     Heart attack Maternal Grandmother     Hypertension Maternal Grandmother     Stroke Maternal Grandmother     Coronary artery disease Maternal Grandmother     Alcohol abuse Maternal Grandmother     Cancer Maternal Grandmother      "Depression Maternal Grandmother     Heart disease Maternal Grandfather     Hypertension Maternal Grandfather     Alcohol abuse Maternal Grandfather     Depression Maternal Grandfather     Hypertension Paternal Grandmother     Alcohol abuse Paternal Grandmother     Depression Paternal Grandmother     Heart disease Paternal Grandmother     Dementia Paternal Grandfather     Hypertension Paternal Grandfather     Depression Paternal Grandfather     Hearing loss Paternal Grandfather     Vision loss Paternal Grandfather     Alcohol abuse Father     Depression Father         Patient Care Team:  Jann Grajeda DO as PCP - General (Family Medicine)  Kallie Ambrose MD as Consulting Physician (Obstetrics and Gynecology)  GARO Zhou Jr., MD as Consulting Physician (Gynecology)  Yaakov Long MD as Consulting Physician (Cardiology)     Subjective:     Review of Systems   Constitutional:  Negative for chills and fever.   Respiratory:  Negative for shortness of breath.    Cardiovascular:  Positive for palpitations. Negative for chest pain.   Neurological:  Negative for dizziness and headaches.   Psychiatric/Behavioral:  The patient is nervous/anxious. The patient does not have insomnia.      See HPI for details  All Other ROS: Negative except as stated in HPI.       Objective:     /78   Pulse 71   Temp 98.1 °F (36.7 °C) (Tympanic)   Resp 16   Ht 5' 0.24" (1.53 m)   Wt 52.6 kg (116 lb)   LMP 02/18/2023   SpO2 99%   BMI 22.48 kg/m²     Physical Exam  Vitals reviewed.   Constitutional:       General: She is not in acute distress.     Appearance: Normal appearance.   Cardiovascular:      Rate and Rhythm: Normal rate and regular rhythm.      Heart sounds: No murmur heard.    No friction rub. No gallop.   Pulmonary:      Effort: No respiratory distress.      Breath sounds: No wheezing, rhonchi or rales.   Musculoskeletal:         General: No swelling, tenderness or deformity.      Right lower leg: No edema. "      Left lower leg: No edema.   Skin:     General: Skin is warm and dry.      Findings: No lesion or rash.   Neurological:      General: No focal deficit present.      Mental Status: She is alert.   Psychiatric:         Mood and Affect: Mood normal.       Assessment/Plan:     1. Generalized anxiety disorder  -     sertraline (ZOLOFT) 50 MG tablet; Take 1 tablet (50 mg total) by mouth once daily.  Dispense: 30 tablet; Refill: 11  -     clonazePAM (KLONOPIN) 1 MG tablet; Take 1 tablet (1 mg total) by mouth 2 (two) times daily as needed for Anxiety.  Dispense: 60 tablet; Refill: 5    2. Tachycardia  Overview:  Well controlled with metoprolol    Orders:  -     metoprolol succinate (TOPROL-XL) 25 MG 24 hr tablet; Take 1 tablet (25 mg total) by mouth every morning.  Dispense: 30 tablet; Refill: 5    3. Enlarged skin mole  -     Ambulatory referral/consult to Dermatology; Future; Expected date: 03/20/2023          Follow up:     Follow up in about 6 months (around 9/20/2023) for Wellness. In addition to their scheduled follow up, the patient has also been instructed to follow up on as needed basis.

## 2023-06-19 ENCOUNTER — OFFICE VISIT (OUTPATIENT)
Dept: FAMILY MEDICINE | Facility: CLINIC | Age: 33
End: 2023-06-19
Payer: COMMERCIAL

## 2023-06-19 VITALS
DIASTOLIC BLOOD PRESSURE: 76 MMHG | HEART RATE: 76 BPM | OXYGEN SATURATION: 99 % | BODY MASS INDEX: 21.4 KG/M2 | WEIGHT: 109 LBS | SYSTOLIC BLOOD PRESSURE: 115 MMHG | HEIGHT: 60 IN | RESPIRATION RATE: 18 BRPM | TEMPERATURE: 99 F

## 2023-06-19 DIAGNOSIS — Z00.00 ROUTINE GENERAL MEDICAL EXAMINATION AT A HEALTH CARE FACILITY: Primary | ICD-10-CM

## 2023-06-19 DIAGNOSIS — Z72.0 TOBACCO USER: ICD-10-CM

## 2023-06-19 DIAGNOSIS — F41.1 GENERALIZED ANXIETY DISORDER: ICD-10-CM

## 2023-06-19 PROCEDURE — 1160F RVW MEDS BY RX/DR IN RCRD: CPT | Mod: CPTII,,, | Performed by: FAMILY MEDICINE

## 2023-06-19 PROCEDURE — 99395 PREV VISIT EST AGE 18-39: CPT | Mod: ,,, | Performed by: FAMILY MEDICINE

## 2023-06-19 PROCEDURE — 3008F PR BODY MASS INDEX (BMI) DOCUMENTED: ICD-10-PCS | Mod: CPTII,,, | Performed by: FAMILY MEDICINE

## 2023-06-19 PROCEDURE — 3074F SYST BP LT 130 MM HG: CPT | Mod: CPTII,,, | Performed by: FAMILY MEDICINE

## 2023-06-19 PROCEDURE — 1159F MED LIST DOCD IN RCRD: CPT | Mod: CPTII,,, | Performed by: FAMILY MEDICINE

## 2023-06-19 PROCEDURE — 3074F PR MOST RECENT SYSTOLIC BLOOD PRESSURE < 130 MM HG: ICD-10-PCS | Mod: CPTII,,, | Performed by: FAMILY MEDICINE

## 2023-06-19 PROCEDURE — 3008F BODY MASS INDEX DOCD: CPT | Mod: CPTII,,, | Performed by: FAMILY MEDICINE

## 2023-06-19 PROCEDURE — 1159F PR MEDICATION LIST DOCUMENTED IN MEDICAL RECORD: ICD-10-PCS | Mod: CPTII,,, | Performed by: FAMILY MEDICINE

## 2023-06-19 PROCEDURE — 3078F PR MOST RECENT DIASTOLIC BLOOD PRESSURE < 80 MM HG: ICD-10-PCS | Mod: CPTII,,, | Performed by: FAMILY MEDICINE

## 2023-06-19 PROCEDURE — 1160F PR REVIEW ALL MEDS BY PRESCRIBER/CLIN PHARMACIST DOCUMENTED: ICD-10-PCS | Mod: CPTII,,, | Performed by: FAMILY MEDICINE

## 2023-06-19 PROCEDURE — 99395 PR PREVENTIVE VISIT,EST,18-39: ICD-10-PCS | Mod: ,,, | Performed by: FAMILY MEDICINE

## 2023-06-19 PROCEDURE — 3078F DIAST BP <80 MM HG: CPT | Mod: CPTII,,, | Performed by: FAMILY MEDICINE

## 2023-06-19 NOTE — PROGRESS NOTES
Patient ID: [id number]     Chief Complaint: Annual Exam        HPI:     Windy Galarza is a 32 y.o. female here today for an annual wellness visit. Reviewed and discussed lab results.  Overall she feels well. No other complaints today.         ----------------------------  Anxiety disorder, unspecified  Eating disorder, unspecified  GERD (gastroesophageal reflux disease)  IBS (irritable bowel syndrome)  Insomnia  Lentigines  Major depression, recurrent  Tachycardia     Past Surgical History:   Procedure Laterality Date    DILATION AND CURETTAGE OF UTERUS  01/28/2021    WISDOM TOOTH EXTRACTION  2016       Review of patient's allergies indicates:  No Known Allergies    Outpatient Medications Marked as Taking for the 6/19/23 encounter (Office Visit) with Jann Grajeda, DO   Medication Sig Dispense Refill    clonazePAM (KLONOPIN) 1 MG tablet Take 1 tablet (1 mg total) by mouth 2 (two) times daily as needed for Anxiety. 60 tablet 5    metoprolol succinate (TOPROL-XL) 25 MG 24 hr tablet Take 1 tablet (25 mg total) by mouth every morning. 30 tablet 5    sertraline (ZOLOFT) 50 MG tablet Take 1 tablet (50 mg total) by mouth once daily. 30 tablet 11    valACYclovir (VALTREX) 1000 MG tablet Take 1,000 mg by mouth once daily.         Social History     Socioeconomic History    Marital status: Single   Occupational History    Occupation:  - [employer]   Tobacco Use    Smoking status: Some Days     Packs/day: 0.25     Types: Cigarettes    Smokeless tobacco: Never   Substance and Sexual Activity    Alcohol use: Not Currently    Drug use: Never    Sexual activity: Not Currently     Partners: Male     Birth control/protection: Abstinence     Social Determinants of Health     Financial Resource Strain: Low Risk     Difficulty of Paying Living Expenses: Not hard at all   Food Insecurity: No Food Insecurity    Worried About Running Out of Food in the Last Year: Never true    Ran Out of Food in the Last Year: Never true    Transportation Needs: No Transportation Needs    Lack of Transportation (Medical): No    Lack of Transportation (Non-Medical): No   Physical Activity: Sufficiently Active    Days of Exercise per Week: 6 days    Minutes of Exercise per Session: 30 min   Stress: Stress Concern Present    Feeling of Stress : To some extent   Social Connections: Moderately Integrated    Frequency of Communication with Friends and Family: More than three times a week    Frequency of Social Gatherings with Friends and Family: More than three times a week    Attends [employer] Services: More than 4 times per year    Active Member of Clubs or Organizations: Yes    Attends Club or Organization Meetings: 1 to 4 times per year    Marital Status: Never    Housing Stability: Low Risk     Unable to Pay for Housing in the Last Year: No    Number of Places Lived in the Last Year: 1    Unstable Housing in the Last Year: No        Family History   Problem Relation Age of Onset    Heart disease Mother     Diabetes Mellitus Mother     Hypertension Mother     Tuberculosis Mother     Alcohol abuse Mother     Depression Mother     Mental illness Mother     Miscarriages / Stillbirths Mother     Thyroid disease Sister     Alcohol abuse Sister     Depression Sister     Heart attack Maternal Grandmother     Hypertension Maternal Grandmother     Stroke Maternal Grandmother     Coronary artery disease Maternal Grandmother     Alcohol abuse Maternal Grandmother     Cancer Maternal Grandmother     Depression Maternal Grandmother     Heart disease Maternal Grandfather     Hypertension Maternal Grandfather     Alcohol abuse Maternal Grandfather     Depression Maternal Grandfather     Hypertension Paternal Grandmother     Alcohol abuse Paternal Grandmother     Depression Paternal Grandmother     Heart disease Paternal Grandmother     Dementia Paternal Grandfather     Hypertension Paternal Grandfather     Depression Paternal Grandfather     Hearing loss  "Paternal Grandfather     Vision loss Paternal Grandfather     Alcohol abuse Father     Depression Father         Patient Care Team:  Jann Grajeda DO as PCP - General (Family Medicine)  Kallie Ambrose MD as Consulting Physician (Obstetrics and Gynecology)  GARO Zhou Jr., MD as Consulting Physician (Gynecology)  Yaakov Long MD as Consulting Physician (Cardiology)  Sage Kong MD as Consulting Physician (Dermatology)       Subjective:     Review of Systems   Constitutional:  Negative for chills and fever.   Respiratory:  Negative for shortness of breath.    Cardiovascular:  Negative for chest pain.   Gastrointestinal:  Negative for constipation and diarrhea.   Neurological:  Negative for headaches.   Psychiatric/Behavioral:  The patient does not have insomnia.      See HPI for details      All Other ROS: Negative except as stated in HPI.       Objective:     /76   Pulse 76   Temp 98.6 °F (37 °C) (Tympanic)   Resp 18   Ht 5' 0.24" (1.53 m)   Wt 49.4 kg (109 lb)   LMP 06/16/2023   SpO2 99%   BMI 21.12 kg/m²     Physical Exam  Vitals reviewed.   Constitutional:       General: She is not in acute distress.     Appearance: Normal appearance.   Cardiovascular:      Rate and Rhythm: Normal rate and regular rhythm.      Heart sounds: No murmur heard.    No friction rub. No gallop.   Pulmonary:      Effort: No respiratory distress.      Breath sounds: No wheezing, rhonchi or rales.   Musculoskeletal:         General: No swelling, tenderness or deformity.      Right lower leg: No edema.      Left lower leg: No edema.   Skin:     General: Skin is warm and dry.      Findings: No lesion or rash.   Neurological:      General: No focal deficit present.      Mental Status: She is alert.   Psychiatric:         Mood and Affect: Mood normal.           Assessment:       ICD-10-CM ICD-9-CM   1. Routine general medical examination at a health care facility  Z00.00 V70.0   2. Generalized anxiety " disorder  F41.1 300.02   3. Tobacco user  Z72.0 305.1        Plan:         Health Maintenance Topics with due status: Not Due       Topic Last Completion Date    Cervical Cancer Screening 02/10/2021    Influenza Vaccine Not Due        Cervical Cancer Screening - Next due 2/10/26. Last pap with PVP cotesting on 2/10/21.     Eye Exam - Recommend annually.    Dental Exam - Recommend biannual exams.     Vaccinations -   Immunization History   Administered Date(s) Administered    Hepatitis A, Adult 10/03/2005    Hepatitis B, Pediatric/Adolescent 07/24/2000, 09/11/2000, 01/29/2001    HiB PRP-T 11/01/1990    MMR 04/05/1990, 08/19/1993    Meningococcal Conjugate (MCV4P) 03/06/2006    OPV 03/09/1989, 06/01/1989, 04/05/1990, 08/19/1993    Td (ADULT) 07/24/2000            1. Routine general medical examination at a health care facility    2. Generalized anxiety disorder  well controlled on current prescription medication    3. Tobacco user  Patient current Vapes, recommended she gradually reduced the nicotine concentration to eventually quit.       Medication List with Changes/Refills   Current Medications    CLONAZEPAM (KLONOPIN) 1 MG TABLET    Take 1 tablet (1 mg total) by mouth 2 (two) times daily as needed for Anxiety.       Start Date: 3/20/2023 End Date: 9/16/2023    METOPROLOL SUCCINATE (TOPROL-XL) 25 MG 24 HR TABLET    Take 1 tablet (25 mg total) by mouth every morning.       Start Date: 3/20/2023 End Date: 9/16/2023    SERTRALINE (ZOLOFT) 50 MG TABLET    Take 1 tablet (50 mg total) by mouth once daily.       Start Date: 3/20/2023 End Date: 3/19/2024    VALACYCLOVIR (VALTREX) 1000 MG TABLET    Take 1,000 mg by mouth once daily.       Start Date: 8/8/2022  End Date: --          The patient's Health Maintenance was reviewed and the following appears to be due at this time:   Health Maintenance Due   Topic Date Due    Hepatitis C Screening  Never done    Lipid Panel  Never done    COVID-19 Vaccine (1) Never done     Pneumococcal Vaccines (Age 0-64) (1 - PCV) Never done    TETANUS VACCINE  07/24/2010         Follow up in about 6 months (around 12/19/2023) for Follow up. In addition to their scheduled follow up, the patient has also been instructed to follow up on as needed basis.

## 2023-09-05 ENCOUNTER — OFFICE VISIT (OUTPATIENT)
Dept: FAMILY MEDICINE | Facility: CLINIC | Age: 33
End: 2023-09-05
Payer: COMMERCIAL

## 2023-09-05 VITALS
HEIGHT: 60 IN | OXYGEN SATURATION: 99 % | RESPIRATION RATE: 20 BRPM | HEART RATE: 85 BPM | SYSTOLIC BLOOD PRESSURE: 94 MMHG | BODY MASS INDEX: 21.4 KG/M2 | TEMPERATURE: 98 F | DIASTOLIC BLOOD PRESSURE: 62 MMHG | WEIGHT: 109 LBS

## 2023-09-05 DIAGNOSIS — B96.89 ACUTE BACTERIAL SINUSITIS: Primary | ICD-10-CM

## 2023-09-05 DIAGNOSIS — J01.90 ACUTE BACTERIAL SINUSITIS: Primary | ICD-10-CM

## 2023-09-05 PROCEDURE — 3074F SYST BP LT 130 MM HG: CPT | Mod: CPTII,,,

## 2023-09-05 PROCEDURE — 1159F MED LIST DOCD IN RCRD: CPT | Mod: CPTII,,,

## 2023-09-05 PROCEDURE — 1160F PR REVIEW ALL MEDS BY PRESCRIBER/CLIN PHARMACIST DOCUMENTED: ICD-10-PCS | Mod: CPTII,,,

## 2023-09-05 PROCEDURE — 3078F PR MOST RECENT DIASTOLIC BLOOD PRESSURE < 80 MM HG: ICD-10-PCS | Mod: CPTII,,,

## 2023-09-05 PROCEDURE — 3078F DIAST BP <80 MM HG: CPT | Mod: CPTII,,,

## 2023-09-05 PROCEDURE — 3008F BODY MASS INDEX DOCD: CPT | Mod: CPTII,,,

## 2023-09-05 PROCEDURE — 1159F PR MEDICATION LIST DOCUMENTED IN MEDICAL RECORD: ICD-10-PCS | Mod: CPTII,,,

## 2023-09-05 PROCEDURE — 99213 PR OFFICE/OUTPT VISIT, EST, LEVL III, 20-29 MIN: ICD-10-PCS | Mod: ,,,

## 2023-09-05 PROCEDURE — 3074F PR MOST RECENT SYSTOLIC BLOOD PRESSURE < 130 MM HG: ICD-10-PCS | Mod: CPTII,,,

## 2023-09-05 PROCEDURE — 1160F RVW MEDS BY RX/DR IN RCRD: CPT | Mod: CPTII,,,

## 2023-09-05 PROCEDURE — 99213 OFFICE O/P EST LOW 20 MIN: CPT | Mod: ,,,

## 2023-09-05 PROCEDURE — 3008F PR BODY MASS INDEX (BMI) DOCUMENTED: ICD-10-PCS | Mod: CPTII,,,

## 2023-09-05 RX ORDER — AMOXICILLIN AND CLAVULANATE POTASSIUM 875; 125 MG/1; MG/1
1 TABLET, FILM COATED ORAL EVERY 12 HOURS
Qty: 14 TABLET | Refills: 0 | Status: SHIPPED | OUTPATIENT
Start: 2023-09-05 | End: 2023-09-12

## 2023-09-05 NOTE — ASSESSMENT & PLAN NOTE
Start Augmentin 875-125 mg per tablet every 12 hours x 7 days.    Increase fluid intake.     Sore Throat: Use OTC lozenges or throat sprays, gargle with warm salt and water, warm tea with honey.  Nasal Congestion: Use OTC Mucinex D, humidifier or steamy shower.    RTC if symptoms worsen.

## 2023-09-05 NOTE — PROGRESS NOTES
Patient ID: [id number]     Chief Complaint: Cough, Sinus Problem, and Sore Throat      HPI:     Windy Galarza is a 32 y.o. female here today for a problem visit. Ms. Temple has been experiencing a cough, sinus congestion, sinus drip and sore throat x 7 days. Patient states that she has been taking OTC dayquil, with no relief. Patient denies any fever, chills, or sweats. No other complaints today.     Past Medical History:   Diagnosis Date    Anxiety disorder, unspecified     Eating disorder, unspecified     GERD (gastroesophageal reflux disease)     IBS (irritable bowel syndrome)     Insomnia     Lentigines     Major depression, recurrent     Tachycardia         Past Surgical History:   Procedure Laterality Date    DILATION AND CURETTAGE OF UTERUS  01/28/2021    WISDOM TOOTH EXTRACTION  2016        Social History     Socioeconomic History    Marital status: Single   Occupational History    Occupation:  - [employer]   Tobacco Use    Smoking status: Some Days     Current packs/day: 0.25     Types: Cigarettes    Smokeless tobacco: Never   Substance and Sexual Activity    Alcohol use: Not Currently    Drug use: Never    Sexual activity: Not Currently     Partners: Male     Birth control/protection: Abstinence     Social Determinants of Health     Financial Resource Strain: Low Risk  (6/19/2023)    Overall Financial Resource Strain (CARDIA)     Difficulty of Paying Living Expenses: Not hard at all   Food Insecurity: No Food Insecurity (6/19/2023)    Hunger Vital Sign     Worried About Running Out of Food in the Last Year: Never true     Ran Out of Food in the Last Year: Never true   Transportation Needs: No Transportation Needs (6/19/2023)    PRAPARE - Transportation     Lack of Transportation (Medical): No     Lack of Transportation (Non-Medical): No   Physical Activity: Sufficiently Active (6/19/2023)    Exercise Vital Sign     Days of Exercise per Week: 6 days     Minutes of Exercise per Session: 30 min    Stress: Stress Concern Present (6/19/2023)    [employer] - Occupational Stress Questionnaire     Feeling of Stress : To some extent   Social Connections: Moderately Integrated (6/19/2023)    Social Connection and Isolation Panel [NHANES]     Frequency of Communication with Friends and Family: More than three times a week     Frequency of Social Gatherings with Friends and Family: More than three times a week     Attends Jehovah's witness Services: More than 4 times per year     Active Member of Clubs or Organizations: Yes     Attends Club or Organization Meetings: 1 to 4 times per year     Marital Status: Never    Housing Stability: Low Risk  (6/19/2023)    Housing Stability Vital Sign     Unable to Pay for Housing in the Last Year: No     Number of Places Lived in the Last Year: 1     Unstable Housing in the Last Year: No        Current Outpatient Medications   Medication Instructions    amoxicillin-clavulanate 875-125mg (AUGMENTIN) 875-125 mg per tablet 1 tablet, Oral, Every 12 hours    clonazePAM (KLONOPIN) 1 mg, Oral, 2 times daily PRN    metoprolol succinate (TOPROL-XL) 25 mg, Oral, Every morning    sertraline (ZOLOFT) 50 mg, Oral, Daily    valACYclovir (VALTREX) 1,000 mg, Oral, Daily       Review of patient's allergies indicates:  No Known Allergies     Patient Care Team:  Jann Grajeda DO as PCP - General (Family Medicine)  Kallie Ambrose MD as Consulting Physician (Obstetrics and Gynecology)  GARO Zhou Jr., MD as Consulting Physician (Gynecology)  Yaakov Long MD as Consulting Physician (Cardiology)  Sage Kong MD as Consulting Physician (Dermatology)     Subjective:     Review of Systems    12 point review of systems conducted, negative except as stated in the history of present illness. See HPI for details.    Objective:     Visit Vitals  BP 94/62 (BP Location: Left arm, Patient Position: Sitting, BP Method: Large (Automatic))   Pulse 85    Temp 97.5 °F (36.4 °C)   Resp 20   Ht 5' (1.524 m)   Wt 49.4 kg (109 lb)   LMP 08/17/2023   SpO2 99%   BMI 21.29 kg/m²       Physical Exam  Vitals and nursing note reviewed.   Constitutional:       General: She is not in acute distress.     Appearance: Normal appearance. She is not toxic-appearing.   HENT:      Head: Normocephalic.      Right Ear: Ear canal normal.      Left Ear: Ear canal normal.      Nose: Congestion present.      Right Turbinates: Enlarged.      Left Turbinates: Enlarged.      Right Sinus: Frontal sinus tenderness present.      Left Sinus: Frontal sinus tenderness present.      Mouth/Throat:      Mouth: Mucous membranes are moist.      Pharynx: Uvula midline. Posterior oropharyngeal erythema present.      Tonsils: No tonsillar exudate.   Eyes:      Extraocular Movements: Extraocular movements intact.      Pupils: Pupils are equal, round, and reactive to light.   Cardiovascular:      Rate and Rhythm: Normal rate and regular rhythm.      Pulses: Normal pulses.   Pulmonary:      Effort: Pulmonary effort is normal. No respiratory distress.      Breath sounds: Normal breath sounds. No stridor or decreased air movement. No wheezing, rhonchi or rales.   Abdominal:      General: Abdomen is flat. Bowel sounds are normal. There is no distension.      Palpations: Abdomen is soft. There is no mass.      Tenderness: There is no abdominal tenderness.   Musculoskeletal:      Cervical back: Neck supple.   Lymphadenopathy:      Cervical: No cervical adenopathy.   Skin:     General: Skin is warm and dry.   Neurological:      General: No focal deficit present.      Mental Status: She is alert and oriented to person, place, and time.         Labs Reviewed:     Chemistry:  Lab Results   Component Value Date     05/31/2022    K 4.4 05/31/2022    CHLORIDE 101 05/31/2022    BUN 11.0 05/31/2022    CREATININE 0.71 05/31/2022    GLUCOSE 71 (L) 05/31/2022    CALCIUM 9.6 05/31/2022    ALKPHOS 60 05/31/2022    LABPROT  7.5 05/31/2022    ALBUMIN 4.4 05/31/2022    AST 23 05/31/2022    ALT 18 05/31/2022    [id number] 36.5 05/31/2022    TSH 0.9638 05/31/2022          Hematology:  Lab Results   Component Value Date    WBC 4.9 05/31/2022    HGB 13.1 05/31/2022    HCT 40.7 05/31/2022     05/31/2022       Urine:  Lab Results   Component Value Date    COLORUA Yellow 05/31/2022    APPEARANCEUA Clear 05/31/2022    SGUA 1.015 05/31/2022    PHUA 8.0 05/31/2022    PROTEINUA Negative 05/31/2022    GLUCOSEUA Negative 05/31/2022    KETONESUA Negative 05/31/2022    BLOODUA Negative 05/31/2022    NITRITESUA Negative 05/31/2022    LEUKOCYTESUR Negative 05/31/2022    RBCUA 0-2 05/31/2022    WBCUA None Seen 05/31/2022    BACTERIA Rare 05/31/2022        Assessment:       ICD-10-CM ICD-9-CM   1. Acute bacterial sinusitis  J01.90 461.9    B96.89         Plan:     1. Acute bacterial sinusitis  Assessment & Plan:  Start Augmentin 875-125 mg per tablet every 12 hours x 7 days.    Increase fluid intake.     Sore Throat: Use OTC lozenges or throat sprays, gargle with warm salt and water, warm tea with honey.  Nasal Congestion: Use OTC Mucinex D, humidifier or steamy shower.    RTC if symptoms worsen.     Orders:  -     amoxicillin-clavulanate 875-125mg (AUGMENTIN) 875-125 mg per tablet; Take 1 tablet by mouth every 12 (twelve) hours. for 7 days  Dispense: 14 tablet; Refill: 0         Follow up if symptoms worsen or fail to improve. In addition to their scheduled follow up, the patient has also been instructed to follow up on as needed basis.     Future Appointments   Date Time Provider Department Center   12/19/2023  8:30 AM Jann Grajeda DO [facility], NP

## 2023-09-05 NOTE — LETTER
September 5, 2023      [facility]  [address] [facility] [address]  Phone: [phone]       Patient: Windy Galarza   YOB: 1990  Date of Visit: 09/05/2023    To Whom It May Concern:    Charlene Galarza  was at [facility] on 09/05/2023. The patient may return to work/school on 09/06/23 with no restrictions. If you have any questions or concerns, or if I can be of further assistance, please do not hesitate to contact me.    Sincerely,    Sally Galarza LPN

## 2023-09-13 DIAGNOSIS — R00.0 TACHYCARDIA: ICD-10-CM

## 2023-09-13 RX ORDER — METOPROLOL SUCCINATE 25 MG/1
25 TABLET, EXTENDED RELEASE ORAL
Qty: 30 TABLET | Refills: 5 | Status: SHIPPED | OUTPATIENT
Start: 2023-09-13 | End: 2024-03-26

## 2023-11-22 DIAGNOSIS — L23.7 ALLERGIC DERMATITIS DUE TO POISON IVY: Primary | ICD-10-CM

## 2023-11-22 RX ORDER — METHYLPREDNISOLONE 4 MG/1
TABLET ORAL
Qty: 21 EACH | Refills: 0 | Status: SHIPPED | OUTPATIENT
Start: 2023-11-22 | End: 2024-01-11

## 2023-12-11 PROBLEM — B96.89 ACUTE BACTERIAL SINUSITIS: Status: RESOLVED | Noted: 2023-09-05 | Resolved: 2023-12-11

## 2023-12-11 PROBLEM — J01.90 ACUTE BACTERIAL SINUSITIS: Status: RESOLVED | Noted: 2023-09-05 | Resolved: 2023-12-11

## 2024-01-11 ENCOUNTER — OFFICE VISIT (OUTPATIENT)
Dept: FAMILY MEDICINE | Facility: CLINIC | Age: 34
End: 2024-01-11
Payer: COMMERCIAL

## 2024-01-11 VITALS
WEIGHT: 113.38 LBS | TEMPERATURE: 98 F | RESPIRATION RATE: 18 BRPM | BODY MASS INDEX: 22.26 KG/M2 | HEIGHT: 60 IN | OXYGEN SATURATION: 98 % | DIASTOLIC BLOOD PRESSURE: 68 MMHG | SYSTOLIC BLOOD PRESSURE: 105 MMHG | HEART RATE: 81 BPM

## 2024-01-11 DIAGNOSIS — F33.9 EPISODE OF RECURRENT MAJOR DEPRESSIVE DISORDER, UNSPECIFIED DEPRESSION EPISODE SEVERITY: ICD-10-CM

## 2024-01-11 DIAGNOSIS — F41.1 GENERALIZED ANXIETY DISORDER: Primary | ICD-10-CM

## 2024-01-11 PROCEDURE — 3008F BODY MASS INDEX DOCD: CPT | Mod: CPTII,,, | Performed by: FAMILY MEDICINE

## 2024-01-11 PROCEDURE — 99214 OFFICE O/P EST MOD 30 MIN: CPT | Mod: ,,, | Performed by: FAMILY MEDICINE

## 2024-01-11 PROCEDURE — 1159F MED LIST DOCD IN RCRD: CPT | Mod: CPTII,,, | Performed by: FAMILY MEDICINE

## 2024-01-11 PROCEDURE — 3074F SYST BP LT 130 MM HG: CPT | Mod: CPTII,,, | Performed by: FAMILY MEDICINE

## 2024-01-11 PROCEDURE — 1160F RVW MEDS BY RX/DR IN RCRD: CPT | Mod: CPTII,,, | Performed by: FAMILY MEDICINE

## 2024-01-11 PROCEDURE — 3078F DIAST BP <80 MM HG: CPT | Mod: CPTII,,, | Performed by: FAMILY MEDICINE

## 2024-01-11 NOTE — PROGRESS NOTES
Patient ID: [id number]     Chief Complaint: Follow-up        HPI:     Windy Galarza is a 33 y.o. female here today for Follow-up Has been trying to taper down her zoloft and clonazepam.     ----------------------------  Anxiety disorder, unspecified  Eating disorder, unspecified  GERD (gastroesophageal reflux disease)  IBS (irritable bowel syndrome)  Insomnia  Lentigines  Major depression, recurrent  Tachycardia     Past Surgical History:   Procedure Laterality Date    DILATION AND CURETTAGE OF UTERUS  01/28/2021    WISDOM TOOTH EXTRACTION  2016       Review of patient's allergies indicates:  No Known Allergies    Outpatient Medications Marked as Taking for the 1/11/24 encounter (Office Visit) with Jann Grajeda, DO   Medication Sig Dispense Refill    clonazePAM (KLONOPIN) 1 MG tablet Take 1 tablet (1 mg total) by mouth 2 (two) times daily as needed for Anxiety. 60 tablet 5    metoprolol succinate (TOPROL-XL) 25 MG 24 hr tablet TAKE 1 TABLET BY MOUTH EVERY DAY IN THE MORNING 30 tablet 5    sertraline (ZOLOFT) 50 MG tablet Take 1 tablet (50 mg total) by mouth once daily. 30 tablet 11    valACYclovir (VALTREX) 1000 MG tablet Take 1,000 mg by mouth once daily.         Social History     Socioeconomic History    Marital status: Single   Occupational History    Occupation:  - [employer]   Tobacco Use    Smoking status: Some Days     Types: Vaping with nicotine    Smokeless tobacco: Never   Substance and Sexual Activity    Alcohol use: Yes     Alcohol/week: 3.0 standard drinks of alcohol     Types: 3 Cans of beer per week    Drug use: Never    Sexual activity: Yes     Partners: Male     Birth control/protection: Abstinence, None     Social Determinants of Health     Financial Resource Strain: Low Risk  (1/11/2024)    Overall Financial Resource Strain (CARDIA)     Difficulty of Paying Living Expenses: Not hard at all   Recent Concern: Financial Resource Strain - High Risk (1/4/2024)    Overall Financial  Resource Strain (CARDIA)     Difficulty of Paying Living Expenses: Hard   Food Insecurity: No Food Insecurity (1/11/2024)    Hunger Vital Sign     Worried About Running Out of Food in the Last Year: Never true     Ran Out of Food in the Last Year: Never true   Recent Concern: Food Insecurity - Food Insecurity Present (1/4/2024)    Hunger Vital Sign     Worried About Running Out of Food in the Last Year: Often true     Ran Out of Food in the Last Year: Often true   Transportation Needs: No Transportation Needs (1/4/2024)    PRAPARE - Transportation     Lack of Transportation (Medical): No     Lack of Transportation (Non-Medical): No   Physical Activity: Inactive (1/4/2024)    Exercise Vital Sign     Days of Exercise per Week: 0 days     Minutes of Exercise per Session: 0 min   Stress: No Stress Concern Present (1/4/2024)    [employer] - Occupational Stress Questionnaire     Feeling of Stress : Only a little   Social Connections: Socially Isolated (1/4/2024)    Social Connection and Isolation Panel [NHANES]     Frequency of Communication with Friends and Family: Once a week     Frequency of Social Gatherings with Friends and Family: Once a week     Attends Jainism Services: More than 4 times per year     Active Member of Clubs or Organizations: No     Attends Club or Organization Meetings: Never     Marital Status: Never    Housing Stability: Low Risk  (1/4/2024)    Housing Stability Vital Sign     Unable to Pay for Housing in the Last Year: No     Number of Places Lived in the Last Year: 1     Unstable Housing in the Last Year: No        Family History   Problem Relation Age of Onset    Heart disease Mother     Diabetes Mellitus Mother     Hypertension Mother     Tuberculosis Mother     Alcohol abuse Mother     Depression Mother     Mental illness Mother     Miscarriages / Stillbirths Mother     Thyroid disease Sister     Alcohol abuse Sister     Depression Sister     Heart  "attack Maternal Grandmother     Hypertension Maternal Grandmother     Stroke Maternal Grandmother     Coronary artery disease Maternal Grandmother     Alcohol abuse Maternal Grandmother     Cancer Maternal Grandmother     Depression Maternal Grandmother     Heart disease Maternal Grandfather     Hypertension Maternal Grandfather     Alcohol abuse Maternal Grandfather     Depression Maternal Grandfather     Hypertension Paternal Grandmother     Alcohol abuse Paternal Grandmother     Depression Paternal Grandmother     Heart disease Paternal Grandmother     Dementia Paternal Grandfather     Hypertension Paternal Grandfather     Depression Paternal Grandfather     Hearing loss Paternal Grandfather     Vision loss Paternal Grandfather     Alcohol abuse Father     Depression Father         Patient Care Team:  Jann Grajeda DO as PCP - General (Family Medicine)  Kallie Ambrose MD as Consulting Physician (Obstetrics and Gynecology)  GARO Zhou Jr., MD as Consulting Physician (Gynecology)  Yaakov Long MD as Consulting Physician (Cardiology)  Sage Kong MD as Consulting Physician (Dermatology)     Subjective:     Review of Systems   Constitutional:  Negative for chills and fever.   Respiratory:  Negative for shortness of breath.    Cardiovascular:  Negative for chest pain.   Gastrointestinal:  Negative for constipation and diarrhea.   Neurological:  Negative for dizziness and headaches.   Psychiatric/Behavioral:  The patient is nervous/anxious. The patient does not have insomnia.        See HPI for details  All Other ROS: Negative except as stated in HPI.     Objective:     /68   Pulse 81   Temp 97.6 °F (36.4 °C) (Temporal)   Resp 18   Ht 4' 11.84" (1.52 m)   Wt 51.4 kg (113 lb 6.4 oz)   SpO2 98%   BMI 22.26 kg/m²     Physical Exam  Vitals reviewed.   Constitutional:       General: She is not in acute distress.     Appearance: Normal appearance.   Cardiovascular:      Rate and " Rhythm: Normal rate and regular rhythm.      Heart sounds: No murmur heard.     No friction rub. No gallop.   Pulmonary:      Effort: No respiratory distress.      Breath sounds: No wheezing, rhonchi or rales.   Musculoskeletal:         General: No swelling, tenderness or deformity.      Right lower leg: No edema.      Left lower leg: No edema.   Skin:     General: Skin is warm and dry.      Findings: No lesion or rash.   Neurological:      General: No focal deficit present.      Mental Status: She is alert.   Psychiatric:         Mood and Affect: Mood normal.         Assessment/Plan:     1. Generalized anxiety disorder  -Patient is trying to taper down her zoloft and clonazepam use.  Will try trintellix rather than restarting zoloft or clonazepam first.   2. Episode of recurrent major depressive disorder, unspecified depression episode severity    Trintellix 5mg 2 boxes take 1 tablet daily.   Lot [id number]  Exp Date Aug 2025  Follow up:     Follow up in about 3 months (around 4/11/2024) for Follow up anxiety. In addition to their scheduled follow up, the patient has also been instructed to follow up on as needed basis.

## 2024-01-25 DIAGNOSIS — F33.9 EPISODE OF RECURRENT MAJOR DEPRESSIVE DISORDER, UNSPECIFIED DEPRESSION EPISODE SEVERITY: ICD-10-CM

## 2024-01-25 DIAGNOSIS — F41.1 GENERALIZED ANXIETY DISORDER: Primary | ICD-10-CM

## 2024-01-25 RX ORDER — VORTIOXETINE 5 MG/1
5 TABLET, FILM COATED ORAL DAILY
Qty: 30 TABLET | Refills: 1 | Status: SHIPPED | OUTPATIENT
Start: 2024-01-25 | End: 2024-04-17 | Stop reason: SINTOL

## 2024-03-26 DIAGNOSIS — R00.0 TACHYCARDIA: ICD-10-CM

## 2024-03-26 RX ORDER — METOPROLOL SUCCINATE 25 MG/1
25 TABLET, EXTENDED RELEASE ORAL
Qty: 30 TABLET | Refills: 5 | Status: SHIPPED | OUTPATIENT
Start: 2024-03-26

## 2024-04-17 ENCOUNTER — OFFICE VISIT (OUTPATIENT)
Dept: FAMILY MEDICINE | Facility: CLINIC | Age: 34
End: 2024-04-17
Payer: COMMERCIAL

## 2024-04-17 VITALS
RESPIRATION RATE: 18 BRPM | TEMPERATURE: 98 F | WEIGHT: 113 LBS | BODY MASS INDEX: 22.19 KG/M2 | HEART RATE: 76 BPM | SYSTOLIC BLOOD PRESSURE: 115 MMHG | OXYGEN SATURATION: 99 % | DIASTOLIC BLOOD PRESSURE: 72 MMHG | HEIGHT: 60 IN

## 2024-04-17 DIAGNOSIS — F41.1 GENERALIZED ANXIETY DISORDER: Primary | ICD-10-CM

## 2024-04-17 DIAGNOSIS — R61 DIAPHORESIS: ICD-10-CM

## 2024-04-17 PROCEDURE — 3078F DIAST BP <80 MM HG: CPT | Mod: CPTII,,, | Performed by: FAMILY MEDICINE

## 2024-04-17 PROCEDURE — 1159F MED LIST DOCD IN RCRD: CPT | Mod: CPTII,,, | Performed by: FAMILY MEDICINE

## 2024-04-17 PROCEDURE — 3008F BODY MASS INDEX DOCD: CPT | Mod: CPTII,,, | Performed by: FAMILY MEDICINE

## 2024-04-17 PROCEDURE — 1160F RVW MEDS BY RX/DR IN RCRD: CPT | Mod: CPTII,,, | Performed by: FAMILY MEDICINE

## 2024-04-17 PROCEDURE — 99214 OFFICE O/P EST MOD 30 MIN: CPT | Mod: ,,, | Performed by: FAMILY MEDICINE

## 2024-04-17 PROCEDURE — 3074F SYST BP LT 130 MM HG: CPT | Mod: CPTII,,, | Performed by: FAMILY MEDICINE

## 2024-04-17 RX ORDER — HYDROXYZINE HYDROCHLORIDE 25 MG/1
25 TABLET, FILM COATED ORAL 4 TIMES DAILY PRN
Qty: 40 TABLET | Refills: 0 | Status: SHIPPED | OUTPATIENT
Start: 2024-04-17 | End: 2024-04-27

## 2024-04-17 NOTE — PROGRESS NOTES
Patient ID: [id number]     Chief Complaint: Follow-up    HPI:     Windy Galarza is a 33 y.o. female here today for Follow-up for anxiety. Is no longer on the clonazepam and did not tolerate the trintellix. Experiencing frequent episodes of diaphoresis.     -------------------------------------    Anxiety disorder, unspecified    Eating disorder, unspecified    GERD (gastroesophageal reflux disease)    IBS (irritable bowel syndrome)    Insomnia    Lentigines    Major depression, recurrent    Tachycardia        Past Surgical History:   Procedure Laterality Date    DILATION AND CURETTAGE OF UTERUS  01/28/2021    WISDOM TOOTH EXTRACTION  2016       Review of patient's allergies indicates:  No Known Allergies    Current Outpatient Medications   Medication Sig Dispense Refill    metoprolol succinate (TOPROL-XL) 25 MG 24 hr tablet TAKE 1 TABLET BY MOUTH EVERY DAY IN THE MORNING 30 tablet 5    valACYclovir (VALTREX) 1000 MG tablet Take 1,000 mg by mouth once daily.      hydrOXYzine HCL (ATARAX) 25 MG tablet Take 1 tablet (25 mg total) by mouth 4 (four) times daily as needed for Anxiety. 40 tablet 0     No current facility-administered medications for this visit.       Social History     Socioeconomic History    Marital status: Single   Occupational History    Occupation:  - [employer]   Tobacco Use    Smoking status: Some Days     Types: Vaping with nicotine    Smokeless tobacco: Never   Substance and Sexual Activity    Alcohol use: Yes     Alcohol/week: 3.0 standard drinks of alcohol     Types: 3 Cans of beer per week    Drug use: Never    Sexual activity: Yes     Partners: Male     Birth control/protection: Abstinence, None     Social Determinants of Health     Financial Resource Strain: Low Risk  (1/11/2024)    Overall Financial Resource Strain (CARDIA)     Difficulty of Paying Living Expenses: Not hard at all   Recent Concern: Financial Resource Strain - High Risk (1/4/2024)    Overall Financial Resource  Strain (CARDIA)     Difficulty of Paying Living Expenses: Hard   Food Insecurity: No Food Insecurity (1/11/2024)    Hunger Vital Sign     Worried About Running Out of Food in the Last Year: Never true     Ran Out of Food in the Last Year: Never true   Recent Concern: Food Insecurity - Food Insecurity Present (1/4/2024)    Hunger Vital Sign     Worried About Running Out of Food in the Last Year: Often true     Ran Out of Food in the Last Year: Often true   Transportation Needs: No Transportation Needs (1/4/2024)    PRAPARE - Transportation     Lack of Transportation (Medical): No     Lack of Transportation (Non-Medical): No   Physical Activity: Inactive (1/4/2024)    Exercise Vital Sign     Days of Exercise per Week: 0 days     Minutes of Exercise per Session: 0 min   Stress: No Stress Concern Present (1/4/2024)    [employer] [place] [employer] - Occupational Stress Questionnaire     Feeling of Stress : Only a little   Social Connections: Socially Isolated (1/4/2024)    Social Connection and Isolation Panel [NHANES]     Frequency of Communication with Friends and Family: Once a week     Frequency of Social Gatherings with Friends and Family: Once a week     Attends Episcopalian Services: More than 4 times per year     Active Member of Clubs or Organizations: No     Attends Club or Organization Meetings: Never     Marital Status: Never    Housing Stability: Low Risk  (1/4/2024)    Housing Stability Vital Sign     Unable to Pay for Housing in the Last Year: No     Number of Places Lived in the Last Year: 1     Unstable Housing in the Last Year: No        Family History   Problem Relation Name Age of Onset    Heart disease Mother Mom     Diabetes Mellitus Mother Mom     Hypertension Mother Mom     Tuberculosis Mother Mom     Alcohol abuse Mother Mom     Depression Mother Mom     Mental illness Mother Mom     Miscarriages / Stillbirths Mother Mom     Thyroid disease Sister Sister     Alcohol abuse Sister  "Sister     Depression Sister Sister     Heart attack Maternal Grandmother Maw Barras     Hypertension Maternal Grandmother Maw Barras     Stroke Maternal Grandmother Maw Barras     Coronary artery disease Maternal Grandmother Maw Barras     Alcohol abuse Maternal Grandmother Maw Barras     Cancer Maternal Grandmother Maw Barras     Depression Maternal Grandmother Maw Barras     Heart disease Maternal Grandfather Paw Barras     Hypertension Maternal Grandfather Paw Barras     Alcohol abuse Maternal Grandfather Paw Barras     Depression Maternal Grandfather Paw Barras     Hypertension Paternal Grandmother Maw Maw     Alcohol abuse Paternal Grandmother Maw Maw     Depression Paternal Grandmother Maw Maw     Heart disease Paternal Grandmother Maw Maw     Dementia Paternal Grandfather [place]     Hypertension Paternal Grandfather [place]     Depression Paternal Grandfather [place]     Hearing loss Paternal Grandfather [place]     Vision loss Paternal Grandfather [place]     Alcohol abuse Father Dad     Depression Father Dad         Patient Care Team:  Jann Grajeda DO as PCP - General (Family Medicine)  Kallie Ambrose MD as Consulting Physician (Obstetrics and Gynecology)  GARO Zhou Jr., MD as Consulting Physician (Gynecology)  Yaakov Long MD as Consulting Physician (Cardiology)  Sage Kong MD as Consulting Physician (Dermatology)     Subjective:     Review of Systems   Constitutional:  Negative for chills and fever.   Respiratory:  Negative for shortness of breath.    Cardiovascular:  Negative for chest pain.   Gastrointestinal:  Negative for constipation and diarrhea.   Neurological:  Negative for dizziness and headaches.   Psychiatric/Behavioral:  The patient does not have insomnia.        See HPI for details  All Other ROS: Negative except as stated in HPI.       Objective:     /72   Pulse 76   Temp 97.6 °F (36.4 °C) (Temporal)   Resp 18   Ht 4' 11.84" (1.52 m)   Wt 51.3 " kg (113 lb)   SpO2 99%   BMI 22.18 kg/m²     Physical Exam  Vitals reviewed.   Constitutional:       General: She is not in acute distress.     Appearance: Normal appearance.   Cardiovascular:      Rate and Rhythm: Normal rate and regular rhythm.      Heart sounds: No murmur heard.     No friction rub. No gallop.   Pulmonary:      Effort: No respiratory distress.      Breath sounds: No wheezing, rhonchi or rales.   Musculoskeletal:         General: No swelling, tenderness or deformity.      Right lower leg: No edema.      Left lower leg: No edema.   Skin:     General: Skin is warm and dry.      Findings: No lesion or rash.   Neurological:      General: No focal deficit present.      Mental Status: She is alert.   Psychiatric:         Mood and Affect: Mood normal.         Assessment/Plan:     1. Generalized anxiety disorder  -     hydrOXYzine HCL (ATARAX) 25 MG tablet; Take 1 tablet (25 mg total) by mouth 4 (four) times daily as needed for Anxiety.  Dispense: 40 tablet; Refill: 0    2. Diaphoresis  -     Comprehensive Metabolic Panel; Future; Expected date: 04/17/2024  -     Estradiol; Future; Expected date: 04/17/2024  -     Ferritin; Future; Expected date: 04/17/2024  -     Luteinizing Hormone; Future; Expected date: 04/17/2024  -     Progesterone; Future; Expected date: 04/17/2024  -     CBC Auto Differential; Future; Expected date: 04/17/2024  -     Follicle Stimulating Hormone; Future; Expected date: 04/17/2024  -     TSH; Future; Expected date: 04/17/2024        Follow up:     Follow up in about 3 months (around 7/17/2024) for Wellness. In addition to their scheduled follow up, the patient has also been instructed to follow up on as needed basis.

## 2024-04-19 ENCOUNTER — LAB VISIT (OUTPATIENT)
Dept: LAB | Facility: HOSPITAL | Age: 34
End: 2024-04-19
Attending: FAMILY MEDICINE
Payer: COMMERCIAL

## 2024-04-19 DIAGNOSIS — R61 DIAPHORESIS: ICD-10-CM

## 2024-04-19 LAB
ALBUMIN SERPL-MCNC: 4.2 G/DL (ref 3.5–5)
ALBUMIN/GLOB SERPL: 1.4 RATIO (ref 1.1–2)
ALP SERPL-CCNC: 46 UNIT/L (ref 40–150)
ALT SERPL-CCNC: 9 UNIT/L (ref 0–55)
AST SERPL-CCNC: 17 UNIT/L (ref 5–34)
BASOPHILS # BLD AUTO: 0.02 X10(3)/MCL
BASOPHILS NFR BLD AUTO: 0.3 %
BILIRUB SERPL-MCNC: 0.7 MG/DL
BUN SERPL-MCNC: 9 MG/DL (ref 7–18.7)
CALCIUM SERPL-MCNC: 9.3 MG/DL (ref 8.4–10.2)
CHLORIDE SERPL-SCNC: 106 MMOL/L (ref 98–107)
CO2 SERPL-SCNC: 26 MMOL/L (ref 22–29)
CREAT SERPL-MCNC: 0.77 MG/DL (ref 0.55–1.02)
EOSINOPHIL # BLD AUTO: 0.12 X10(3)/MCL (ref 0–0.9)
EOSINOPHIL NFR BLD AUTO: 2.1 %
ERYTHROCYTE [DISTWIDTH] IN BLOOD BY AUTOMATED COUNT: 11.6 % (ref 11.5–17)
ESTRADIOL SERPL HS-MCNC: 147 PG/ML
FERRITIN SERPL-MCNC: 30.4 NG/ML (ref 4.63–204)
FSH SERPL-ACNC: 2.5 MIU/ML
GFR SERPLBLD CREATININE-BSD FMLA CKD-EPI: >60 MLS/MIN/1.73/M2
GLOBULIN SER-MCNC: 3 GM/DL (ref 2.4–3.5)
GLUCOSE SERPL-MCNC: 85 MG/DL (ref 74–100)
HCT VFR BLD AUTO: 39.1 % (ref 37–47)
HGB BLD-MCNC: 13.3 G/DL (ref 12–16)
IMM GRANULOCYTES # BLD AUTO: 0.01 X10(3)/MCL (ref 0–0.04)
IMM GRANULOCYTES NFR BLD AUTO: 0.2 %
LH SERPL-ACNC: 3.76 MIU/ML
LYMPHOCYTES # BLD AUTO: 1.24 X10(3)/MCL (ref 0.6–4.6)
LYMPHOCYTES NFR BLD AUTO: 21.5 %
MCH RBC QN AUTO: 33 PG (ref 27–31)
MCHC RBC AUTO-ENTMCNC: 34 G/DL (ref 33–36)
MCV RBC AUTO: 97 FL (ref 80–94)
MONOCYTES # BLD AUTO: 0.35 X10(3)/MCL (ref 0.1–1.3)
MONOCYTES NFR BLD AUTO: 6.1 %
NEUTROPHILS # BLD AUTO: 4.03 X10(3)/MCL (ref 2.1–9.2)
NEUTROPHILS NFR BLD AUTO: 69.8 %
NRBC BLD AUTO-RTO: 0 %
PLATELET # BLD AUTO: 257 X10(3)/MCL (ref 130–400)
PMV BLD AUTO: 9.9 FL (ref 7.4–10.4)
POTASSIUM SERPL-SCNC: 4.2 MMOL/L (ref 3.5–5.1)
PROGEST SERPL-MCNC: 15.1 NG/ML
PROT SERPL-MCNC: 7.2 GM/DL (ref 6.4–8.3)
RBC # BLD AUTO: 4.03 X10(6)/MCL (ref 4.2–5.4)
SODIUM SERPL-SCNC: 140 MMOL/L (ref 136–145)
TSH SERPL-ACNC: 2.02 UIU/ML (ref 0.35–4.94)
WBC # SPEC AUTO: 5.77 X10(3)/MCL (ref 4.5–11.5)

## 2024-04-19 PROCEDURE — 83001 ASSAY OF GONADOTROPIN (FSH): CPT

## 2024-04-19 PROCEDURE — 84144 ASSAY OF PROGESTERONE: CPT

## 2024-04-19 PROCEDURE — 80053 COMPREHEN METABOLIC PANEL: CPT

## 2024-04-19 PROCEDURE — 85025 COMPLETE CBC W/AUTO DIFF WBC: CPT

## 2024-04-19 PROCEDURE — 36415 COLL VENOUS BLD VENIPUNCTURE: CPT

## 2024-04-19 PROCEDURE — 83002 ASSAY OF GONADOTROPIN (LH): CPT

## 2024-04-19 PROCEDURE — 84443 ASSAY THYROID STIM HORMONE: CPT

## 2024-04-19 PROCEDURE — 82670 ASSAY OF TOTAL ESTRADIOL: CPT

## 2024-04-19 PROCEDURE — 82728 ASSAY OF FERRITIN: CPT

## 2024-07-02 DIAGNOSIS — Z11.59 NEED FOR HEPATITIS C SCREENING TEST: ICD-10-CM

## 2024-07-02 DIAGNOSIS — E78.5 HYPERLIPIDEMIA, UNSPECIFIED HYPERLIPIDEMIA TYPE: ICD-10-CM

## 2024-07-02 DIAGNOSIS — Z11.4 SCREENING FOR HIV (HUMAN IMMUNODEFICIENCY VIRUS): ICD-10-CM

## 2024-07-02 DIAGNOSIS — Z00.00 WELLNESS EXAMINATION: Primary | ICD-10-CM

## 2024-07-22 ENCOUNTER — OFFICE VISIT (OUTPATIENT)
Dept: FAMILY MEDICINE | Facility: CLINIC | Age: 34
End: 2024-07-22
Payer: COMMERCIAL

## 2024-07-22 ENCOUNTER — LAB VISIT (OUTPATIENT)
Dept: LAB | Facility: HOSPITAL | Age: 34
End: 2024-07-22
Attending: FAMILY MEDICINE
Payer: COMMERCIAL

## 2024-07-22 VITALS
BODY MASS INDEX: 22.19 KG/M2 | RESPIRATION RATE: 20 BRPM | HEART RATE: 83 BPM | SYSTOLIC BLOOD PRESSURE: 109 MMHG | HEIGHT: 60 IN | DIASTOLIC BLOOD PRESSURE: 74 MMHG | TEMPERATURE: 97 F | WEIGHT: 113 LBS | OXYGEN SATURATION: 99 %

## 2024-07-22 DIAGNOSIS — Z11.4 SCREENING FOR HIV (HUMAN IMMUNODEFICIENCY VIRUS): ICD-10-CM

## 2024-07-22 DIAGNOSIS — Z00.00 WELLNESS EXAMINATION: ICD-10-CM

## 2024-07-22 DIAGNOSIS — Z00.00 ROUTINE GENERAL MEDICAL EXAMINATION AT A HEALTH CARE FACILITY: Primary | ICD-10-CM

## 2024-07-22 DIAGNOSIS — Z11.59 NEED FOR HEPATITIS C SCREENING TEST: ICD-10-CM

## 2024-07-22 DIAGNOSIS — E78.5 HYPERLIPIDEMIA, UNSPECIFIED HYPERLIPIDEMIA TYPE: ICD-10-CM

## 2024-07-22 LAB
ALBUMIN SERPL-MCNC: 4 G/DL (ref 3.5–5)
ALBUMIN/GLOB SERPL: 1.5 RATIO (ref 1.1–2)
ALP SERPL-CCNC: 47 UNIT/L (ref 40–150)
ALT SERPL-CCNC: 9 UNIT/L (ref 0–55)
ANION GAP SERPL CALC-SCNC: 7 MEQ/L
AST SERPL-CCNC: 20 UNIT/L (ref 5–34)
BASOPHILS # BLD AUTO: 0.02 X10(3)/MCL
BASOPHILS NFR BLD AUTO: 0.4 %
BILIRUB SERPL-MCNC: 1.1 MG/DL
BUN SERPL-MCNC: 8 MG/DL (ref 7–18.7)
CALCIUM SERPL-MCNC: 9.4 MG/DL (ref 8.4–10.2)
CHLORIDE SERPL-SCNC: 106 MMOL/L (ref 98–107)
CHOLEST SERPL-MCNC: 152 MG/DL
CHOLEST/HDLC SERPL: 2 {RATIO} (ref 0–5)
CO2 SERPL-SCNC: 28 MMOL/L (ref 22–29)
CREAT SERPL-MCNC: 0.75 MG/DL (ref 0.55–1.02)
CREAT/UREA NIT SERPL: 11
EOSINOPHIL # BLD AUTO: 0.15 X10(3)/MCL (ref 0–0.9)
EOSINOPHIL NFR BLD AUTO: 2.8 %
ERYTHROCYTE [DISTWIDTH] IN BLOOD BY AUTOMATED COUNT: 12 % (ref 11.5–17)
GFR SERPLBLD CREATININE-BSD FMLA CKD-EPI: >60 ML/MIN/1.73/M2
GLOBULIN SER-MCNC: 2.7 GM/DL (ref 2.4–3.5)
GLUCOSE SERPL-MCNC: 93 MG/DL (ref 74–100)
HCT VFR BLD AUTO: 37.3 % (ref 37–47)
HCV AB SERPL QL IA: NONREACTIVE
HDLC SERPL-MCNC: 69 MG/DL (ref 35–60)
HGB BLD-MCNC: 13.1 G/DL (ref 12–16)
HIV 1+2 AB+HIV1 P24 AG SERPL QL IA: NONREACTIVE
IMM GRANULOCYTES # BLD AUTO: 0.02 X10(3)/MCL (ref 0–0.04)
IMM GRANULOCYTES NFR BLD AUTO: 0.4 %
LDLC SERPL CALC-MCNC: 72 MG/DL (ref 50–140)
LYMPHOCYTES # BLD AUTO: 1.33 X10(3)/MCL (ref 0.6–4.6)
LYMPHOCYTES NFR BLD AUTO: 24.4 %
MCH RBC QN AUTO: 33.7 PG (ref 27–31)
MCHC RBC AUTO-ENTMCNC: 35.1 G/DL (ref 33–36)
MCV RBC AUTO: 95.9 FL (ref 80–94)
MONOCYTES # BLD AUTO: 0.33 X10(3)/MCL (ref 0.1–1.3)
MONOCYTES NFR BLD AUTO: 6.1 %
NEUTROPHILS # BLD AUTO: 3.59 X10(3)/MCL (ref 2.1–9.2)
NEUTROPHILS NFR BLD AUTO: 65.9 %
NRBC BLD AUTO-RTO: 0 %
PLATELET # BLD AUTO: 225 X10(3)/MCL (ref 130–400)
PMV BLD AUTO: 9.7 FL (ref 7.4–10.4)
POTASSIUM SERPL-SCNC: 4.1 MMOL/L (ref 3.5–5.1)
PROT SERPL-MCNC: 6.7 GM/DL (ref 6.4–8.3)
RBC # BLD AUTO: 3.89 X10(6)/MCL (ref 4.2–5.4)
SODIUM SERPL-SCNC: 141 MMOL/L (ref 136–145)
TRIGL SERPL-MCNC: 56 MG/DL (ref 37–140)
VLDLC SERPL CALC-MCNC: 11 MG/DL
WBC # BLD AUTO: 5.44 X10(3)/MCL (ref 4.5–11.5)

## 2024-07-22 PROCEDURE — 99395 PREV VISIT EST AGE 18-39: CPT | Mod: ,,, | Performed by: FAMILY MEDICINE

## 2024-07-22 PROCEDURE — 87389 HIV-1 AG W/HIV-1&-2 AB AG IA: CPT

## 2024-07-22 PROCEDURE — 3078F DIAST BP <80 MM HG: CPT | Mod: CPTII,,, | Performed by: FAMILY MEDICINE

## 2024-07-22 PROCEDURE — 80053 COMPREHEN METABOLIC PANEL: CPT

## 2024-07-22 PROCEDURE — 1160F RVW MEDS BY RX/DR IN RCRD: CPT | Mod: CPTII,,, | Performed by: FAMILY MEDICINE

## 2024-07-22 PROCEDURE — 86803 HEPATITIS C AB TEST: CPT

## 2024-07-22 PROCEDURE — 36415 COLL VENOUS BLD VENIPUNCTURE: CPT

## 2024-07-22 PROCEDURE — 85025 COMPLETE CBC W/AUTO DIFF WBC: CPT

## 2024-07-22 PROCEDURE — 80061 LIPID PANEL: CPT

## 2024-07-22 PROCEDURE — 3008F BODY MASS INDEX DOCD: CPT | Mod: CPTII,,, | Performed by: FAMILY MEDICINE

## 2024-07-22 PROCEDURE — 3074F SYST BP LT 130 MM HG: CPT | Mod: CPTII,,, | Performed by: FAMILY MEDICINE

## 2024-07-22 PROCEDURE — 1159F MED LIST DOCD IN RCRD: CPT | Mod: CPTII,,, | Performed by: FAMILY MEDICINE

## 2024-07-22 RX ORDER — HYDROXYZINE HYDROCHLORIDE 25 MG/1
25 TABLET, FILM COATED ORAL 4 TIMES DAILY PRN
COMMUNITY

## 2024-07-22 NOTE — PROGRESS NOTES
Patient ID: [id number]     Chief Complaint: Annual Exam (No concerns/)    HPI:     Windy Galarza is a 33 y.o. female here today for an annual wellness visit. Reviewed and discussed lab results. Overall she feels well.   -------------------------------------    Anxiety disorder, unspecified    Eating disorder, unspecified    GERD (gastroesophageal reflux disease)    IBS (irritable bowel syndrome)    Insomnia    Lentigines    Major depression, recurrent    Tachycardia        Past Surgical History:   Procedure Laterality Date    DILATION AND CURETTAGE OF UTERUS  01/28/2021    WISDOM TOOTH EXTRACTION  2016       Review of patient's allergies indicates:  No Known Allergies    Outpatient Medications Marked as Taking for the 7/22/24 encounter (Office Visit) with Jann Grajeda, DO   Medication Sig Dispense Refill    metoprolol succinate (TOPROL-XL) 25 MG 24 hr tablet TAKE 1 TABLET BY MOUTH EVERY DAY IN THE MORNING 30 tablet 5    valACYclovir (VALTREX) 1000 MG tablet Take 1,000 mg by mouth once daily.         Social History     Socioeconomic History    Marital status: Single   Occupational History    Occupation:  - [employer]   Tobacco Use    Smoking status: Some Days     Types: Vaping with nicotine    Smokeless tobacco: Never   Substance and Sexual Activity    Alcohol use: Yes     Alcohol/week: 3.0 standard drinks of alcohol     Types: 3 Cans of beer per week    Drug use: Never    Sexual activity: Yes     Partners: Male     Birth control/protection: Abstinence, None     Social Determinants of Health     Financial Resource Strain: Low Risk  (1/11/2024)    Overall Financial Resource Strain (CARDIA)     Difficulty of Paying Living Expenses: Not hard at all   Recent Concern: Financial Resource Strain - High Risk (1/4/2024)    Overall Financial Resource Strain (CARDIA)     Difficulty of Paying Living Expenses: Hard   Food Insecurity: No Food Insecurity (1/11/2024)    Hunger Vital Sign     Worried About Running  Out of Food in the Last Year: Never true     Ran Out of Food in the Last Year: Never true   Recent Concern: Food Insecurity - Food Insecurity Present (1/4/2024)    Hunger Vital Sign     Worried About Running Out of Food in the Last Year: Often true     Ran Out of Food in the Last Year: Often true   Transportation Needs: No Transportation Needs (1/4/2024)    PRAPARE - Transportation     Lack of Transportation (Medical): No     Lack of Transportation (Non-Medical): No   Physical Activity: Inactive (1/4/2024)    Exercise Vital Sign     Days of Exercise per Week: 0 days     Minutes of Exercise per Session: 0 min   Stress: No Stress Concern Present (1/4/2024)    [employer] [place] [employer] - Occupational Stress Questionnaire     Feeling of Stress : Only a little   Housing Stability: Low Risk  (1/4/2024)    Housing Stability Vital Sign     Unable to Pay for Housing in the Last Year: No     Number of Places Lived in the Last Year: 1     Unstable Housing in the Last Year: No        Family History   Problem Relation Name Age of Onset    Heart disease Mother Mom     Diabetes Mellitus Mother Mom     Hypertension Mother Mom     Tuberculosis Mother Mom     Alcohol abuse Mother Mom     Depression Mother Mom     Mental illness Mother Mom     Miscarriages / Stillbirths Mother Mom     Thyroid disease Sister Sister     Alcohol abuse Sister Sister     Depression Sister Sister     Heart attack Maternal Grandmother Maw Barras     Hypertension Maternal Grandmother Maw Barras     Stroke Maternal Grandmother Maw Barras     Coronary artery disease Maternal Grandmother Maw Barras     Alcohol abuse Maternal Grandmother Maw Barras     Cancer Maternal Grandmother Maw Barras     Depression Maternal Grandmother Maw Barras     Heart disease Maternal Grandfather Paw Barras     Hypertension Maternal Grandfather Paw Barras     Alcohol abuse Maternal Grandfather Paw Barras     Depression Maternal Grandfather Paw Barras     Hypertension  "Paternal Grandmother Maw Maw     Alcohol abuse Paternal Grandmother Maw Maw     Depression Paternal Grandmother Maw Maw     Heart disease Paternal Grandmother Maw Maw     Dementia Paternal Grandfather [place]     Hypertension Paternal Grandfather [place]     Depression Paternal Grandfather [place]     Hearing loss Paternal Grandfather [place]     Vision loss Paternal Grandfather [place]     Alcohol abuse Father Dad     Depression Father Dad         Patient Care Team:  Jann Grajeda DO as PCP - General (Family Medicine)  Kallie Ambrose MD as Consulting Physician (Obstetrics and Gynecology)  GARO Zhou Jr., MD as Consulting Physician (Gynecology)  Yaakov Long MD as Consulting Physician (Cardiology)  Sage Kong MD as Consulting Physician (Dermatology)       Subjective:     Review of Systems   Constitutional:  Negative for chills and fever.   Respiratory:  Negative for shortness of breath.    Cardiovascular:  Negative for chest pain.   Gastrointestinal:  Negative for constipation and diarrhea.   Neurological:  Negative for dizziness and headaches.   Psychiatric/Behavioral:  The patient does not have insomnia.      See HPI for details  All Other ROS: Negative except as stated in HPI.     Objective:     /74   Pulse 83   Temp 96.8 °F (36 °C)   Resp 20   Ht 4' 11.84" (1.52 m)   Wt 51.3 kg (113 lb)   SpO2 99%   BMI 22.19 kg/m²     Physical Exam  Vitals reviewed.   Constitutional:       General: She is not in acute distress.     Appearance: Normal appearance.   Cardiovascular:      Rate and Rhythm: Normal rate and regular rhythm.      Heart sounds: No murmur heard.     No friction rub. No gallop.   Pulmonary:      Effort: No respiratory distress.      Breath sounds: No wheezing, rhonchi or rales.   Musculoskeletal:         General: No swelling, tenderness or deformity.      Right lower leg: No edema.      Left lower leg: No edema.   Skin:     General: Skin is warm and dry.      " Findings: No lesion or rash.   Neurological:      General: No focal deficit present.      Mental Status: She is alert.   Psychiatric:         Mood and Affect: Mood normal.         Assessment:       ICD-10-CM ICD-9-CM   1. Routine general medical examination at a health care facility  Z00.00 V70.0      Plan:     Health Maintenance Topics with due status: Not Due       Topic Last Completion Date    Cervical Cancer Screening 02/10/2021    Influenza Vaccine Not Due     Vaccinations -   Immunization History   Administered Date(s) Administered    Hepatitis A, Adult 10/03/2005    Hepatitis B, Pediatric/Adolescent 07/24/2000, 09/11/2000, 01/29/2001    HiB PRP-T 11/01/1990    MMR 04/05/1990, 08/19/1993    Meningococcal Conjugate (MCV4P) 03/06/2006    OPV 03/09/1989, 06/01/1989, 04/05/1990, 08/19/1993    Td (ADULT) 07/24/2000      1. Routine general medical examination at a health care facility        Medication List with Changes/Refills   Current Medications    HYDROXYZINE HCL (ATARAX) 25 MG TABLET    Take 25 mg by mouth 4 (four) times daily as needed for Anxiety.       Start Date: --        End Date: --    METOPROLOL SUCCINATE (TOPROL-XL) 25 MG 24 HR TABLET    TAKE 1 TABLET BY MOUTH EVERY DAY IN THE MORNING       Start Date: 3/26/2024 End Date: --    VALACYCLOVIR (VALTREX) 1000 MG TABLET    Take 1,000 mg by mouth once daily.       Start Date: 8/8/2022  End Date: --      The patient's Health Maintenance was reviewed and the following appears to be due at this time:   Health Maintenance Due   Topic Date Due    Pneumococcal Vaccines (Age 0-64) (1 of 2 - PCV) Never done    TETANUS VACCINE  07/24/2010    COVID-19 Vaccine (1 - 2023-24 season) Never done     Follow up in about 6 months (around 1/22/2025) for Follow up chronic conditions. In addition to their scheduled follow up, the patient has also been instructed to follow up on as needed basis.

## 2024-12-17 ENCOUNTER — TELEPHONE (OUTPATIENT)
Dept: FAMILY MEDICINE | Facility: CLINIC | Age: 34
End: 2024-12-17
Payer: COMMERCIAL

## 2024-12-17 DIAGNOSIS — F41.1 GENERALIZED ANXIETY DISORDER: Primary | ICD-10-CM

## 2024-12-17 RX ORDER — HYDROXYZINE HYDROCHLORIDE 25 MG/1
25 TABLET, FILM COATED ORAL 4 TIMES DAILY PRN
Qty: 30 TABLET | Refills: 1 | Status: SHIPPED | OUTPATIENT
Start: 2024-12-17

## 2025-01-27 ENCOUNTER — OFFICE VISIT (OUTPATIENT)
Dept: FAMILY MEDICINE | Facility: CLINIC | Age: 35
End: 2025-01-27
Payer: COMMERCIAL

## 2025-01-27 VITALS
WEIGHT: 119.19 LBS | TEMPERATURE: 97 F | OXYGEN SATURATION: 98 % | HEART RATE: 79 BPM | BODY MASS INDEX: 24.03 KG/M2 | RESPIRATION RATE: 16 BRPM | HEIGHT: 59 IN

## 2025-01-27 DIAGNOSIS — R00.0 TACHYCARDIA: ICD-10-CM

## 2025-01-27 DIAGNOSIS — M67.431 GANGLION CYST OF DORSUM OF RIGHT WRIST: Primary | ICD-10-CM

## 2025-01-27 PROCEDURE — 3008F BODY MASS INDEX DOCD: CPT | Mod: CPTII,,, | Performed by: FAMILY MEDICINE

## 2025-01-27 PROCEDURE — 1160F RVW MEDS BY RX/DR IN RCRD: CPT | Mod: CPTII,,, | Performed by: FAMILY MEDICINE

## 2025-01-27 PROCEDURE — 99213 OFFICE O/P EST LOW 20 MIN: CPT | Mod: ,,, | Performed by: FAMILY MEDICINE

## 2025-01-27 PROCEDURE — 1159F MED LIST DOCD IN RCRD: CPT | Mod: CPTII,,, | Performed by: FAMILY MEDICINE

## 2025-01-27 RX ORDER — METOPROLOL SUCCINATE 25 MG/1
25 TABLET, EXTENDED RELEASE ORAL DAILY
Qty: 30 TABLET | Refills: 5 | Status: SHIPPED | OUTPATIENT
Start: 2025-01-27 | End: 2025-07-26

## 2025-01-27 NOTE — PROGRESS NOTES
Patient ID: [id number]     Chief Complaint: Follow-up and Ganglion Cyst (Noticed about 2 months ago. Getting bigger)    HPI:     Windy Galarza is a 34 y.o. female here today for Follow-up and Ganglion Cyst (Noticed about 2 months ago. Getting bigger).       -------------------------------------    Anxiety disorder, unspecified    Eating disorder, unspecified    GERD (gastroesophageal reflux disease)    IBS (irritable bowel syndrome)    Insomnia    Lentigines    Major depression, recurrent    Tachycardia        Past Surgical History:   Procedure Laterality Date    DILATION AND CURETTAGE OF UTERUS  01/28/2021    WISDOM TOOTH EXTRACTION  2016       Review of patient's allergies indicates:  No Known Allergies    Outpatient Medications Marked as Taking for the 1/27/25 encounter (Office Visit) with Jann Grajeda, DO   Medication Sig Dispense Refill    hydrOXYzine HCL (ATARAX) 25 MG tablet Take 1 tablet (25 mg total) by mouth 4 (four) times daily as needed for Anxiety. 30 tablet 1    valACYclovir (VALTREX) 1000 MG tablet Take 1,000 mg by mouth once daily.      [DISCONTINUED] metoprolol succinate (TOPROL-XL) 25 MG 24 hr tablet TAKE 1 TABLET BY MOUTH EVERY DAY IN THE MORNING 30 tablet 5       Social History     Socioeconomic History    Marital status: Single   Occupational History    Occupation:  - [employer]   Tobacco Use    Smoking status: Some Days     Types: Vaping with nicotine    Smokeless tobacco: Never   Substance and Sexual Activity    Alcohol use: Yes     Alcohol/week: 3.0 standard drinks of alcohol     Types: 3 Cans of beer per week    Drug use: Never    Sexual activity: Yes     Partners: Male     Birth control/protection: Abstinence, None     Social Drivers of Health     Financial Resource Strain: Low Risk  (1/11/2024)    Overall Financial Resource Strain (CARDIA)     Difficulty of Paying Living Expenses: Not hard at all   Recent Concern: Financial Resource Strain - High Risk (1/4/2024)     Overall Financial Resource Strain (CARDIA)     Difficulty of Paying Living Expenses: Hard   Food Insecurity: No Food Insecurity (1/11/2024)    Hunger Vital Sign     Worried About Running Out of Food in the Last Year: Never true     Ran Out of Food in the Last Year: Never true   Recent Concern: Food Insecurity - Food Insecurity Present (1/4/2024)    Hunger Vital Sign     Worried About Running Out of Food in the Last Year: Often true     Ran Out of Food in the Last Year: Often true   Transportation Needs: No Transportation Needs (1/4/2024)    PRAPARE - Transportation     Lack of Transportation (Medical): No     Lack of Transportation (Non-Medical): No   Physical Activity: Inactive (1/4/2024)    Exercise Vital Sign     Days of Exercise per Week: 0 days     Minutes of Exercise per Session: 0 min   Stress: No Stress Concern Present (1/4/2024)    [employer] - Occupational Stress Questionnaire     Feeling of Stress : Only a little   Housing Stability: Low Risk  (1/4/2024)    Housing Stability Vital Sign     Unable to Pay for Housing in the Last Year: No     Number of Places Lived in the Last Year: 1     Unstable Housing in the Last Year: No        Family History   Problem Relation Name Age of Onset    Heart disease Mother Mom     Diabetes Mellitus Mother Mom     Hypertension Mother Mom     Tuberculosis Mother Mom     Alcohol abuse Mother Mom     Depression Mother Mom     Mental illness Mother Mom     Miscarriages / Stillbirths Mother Mom     Thyroid disease Sister Sister     Alcohol abuse Sister Sister     Depression Sister Sister     Heart attack Maternal Grandmother Charissa Orourke     Hypertension Maternal Grandmother Charissa Orourke     Stroke Maternal Grandmother Charissa Orourke     Coronary artery disease Maternal Grandmother Charissa Orourke     Alcohol abuse Maternal Grandmother Charissa Orourke     Cancer Maternal Grandmother Charissa Orourke     Depression Maternal Grandmother Charissa Orourke     Heart disease Maternal  "Grandfather Paw Barras     Hypertension Maternal Grandfather Paw Barras     Alcohol abuse Maternal Grandfather Paw Barras     Depression Maternal Grandfather Paw Barras     Hypertension Paternal Grandmother Maw Maw     Alcohol abuse Paternal Grandmother Maw Maw     Depression Paternal Grandmother Maw Maw     Heart disease Paternal Grandmother Maw Maw     Dementia Paternal Grandfather Duffield     Hypertension Paternal Grandfather Paw Paw     Depression Paternal Grandfather Paw Paw     Hearing loss Paternal Grandfather Duffield     Vision loss Paternal Grandfather Duffield     Alcohol abuse Father Dad     Depression Father Dad         Patient Care Team:  Jann Grajeda DO as PCP - General (Family Medicine)  Kallie Ambrose MD as Consulting Physician (Obstetrics and Gynecology)  GARO Zhou Jr., MD as Consulting Physician (Gynecology)  Yaakov Long MD as Consulting Physician (Cardiology)  Sage Kong MD as Consulting Physician (Dermatology)     Subjective:     Review of Systems   Constitutional:  Negative for chills and fever.   Respiratory:  Negative for shortness of breath.    Cardiovascular:  Negative for chest pain.   Gastrointestinal:  Negative for constipation and diarrhea.   Neurological:  Negative for headaches.   Psychiatric/Behavioral:  The patient does not have insomnia.        See HPI for details  All Other ROS: Negative except as stated in HPI.       Objective:     Pulse 79   Temp 97.2 °F (36.2 °C) (Temporal)   Resp 16   Ht 4' 11" (1.499 m)   Wt 54.1 kg (119 lb 3.2 oz)   SpO2 98%   BMI 24.08 kg/m²     Physical Exam  Vitals reviewed.   Constitutional:       General: She is not in acute distress.     Appearance: Normal appearance.   Cardiovascular:      Rate and Rhythm: Normal rate and regular rhythm.      Heart sounds: No murmur heard.     No friction rub. No gallop.   Pulmonary:      Effort: No respiratory distress.      Breath sounds: No wheezing, rhonchi or rales. "   Musculoskeletal:         General: No swelling, tenderness or deformity.      Right lower leg: No edema.      Left lower leg: No edema.   Skin:     General: Skin is warm and dry.      Findings: No lesion or rash.   Neurological:      General: No focal deficit present.      Mental Status: She is alert.   Psychiatric:         Mood and Affect: Mood normal.         Assessment/Plan:     1. Ganglion cyst of dorsum of right wrist  -     Ambulatory referral/consult to Orthopedics; Future; Expected date: 01/27/2025    2. Tachycardia  Overview:  Well controlled with metoprolol    Orders:  -     metoprolol succinate (TOPROL-XL) 25 MG 24 hr tablet; Take 1 tablet (25 mg total) by mouth once daily.  Dispense: 30 tablet; Refill: 5          Follow up:     Follow up in about 6 months (around 7/27/2025) for Wellness with fasting labs. In addition to their scheduled follow up, the patient has also been instructed to follow up on as needed basis.

## 2025-02-20 ENCOUNTER — HOSPITAL ENCOUNTER (OUTPATIENT)
Dept: RADIOLOGY | Facility: CLINIC | Age: 35
Discharge: HOME | End: 2025-02-20
Attending: ORTHOPAEDIC SURGERY
Payer: COMMERCIAL

## 2025-02-20 ENCOUNTER — OFFICE VISIT (OUTPATIENT)
Dept: ORTHOPEDICS | Facility: CLINIC | Age: 35
End: 2025-02-20
Payer: COMMERCIAL

## 2025-02-20 DIAGNOSIS — M25.531 RIGHT WRIST PAIN: Primary | ICD-10-CM

## 2025-02-20 DIAGNOSIS — M67.431 GANGLION CYST OF DORSUM OF RIGHT WRIST: ICD-10-CM

## 2025-02-20 DIAGNOSIS — M25.531 RIGHT WRIST PAIN: ICD-10-CM

## 2025-02-20 NOTE — PROGRESS NOTES
Subjective:    CC: Cyst of the Right Wrist (R wrist cyst. Pt states she gets them frequently and usually pops them herself but this one is bigger than normal. Appeared about 2 months ago. Not causing any pain but it's bothering her. Can't bend it back too much. Cyst is hard and getting sensitive around area.)       HPI:  Patient comes in today complaining of right wrist cyst.  Patient states over the last couple of years, she has noticed increasing pain, recurrent swelling about her right wrist cyst.  She states occasionally she will take a book, and pop it.  She states it keeps on coming back, continues to be more painful at times.  She is interested in different options.  She denies any numbness or tingling or other complaints.    ROS: Refer to HPI for pertinent ROS. All other 12 point systems negative.    Objective:  There were no vitals filed for this visit.     Physical Exam:  Patient is well-nourished developed female she is awake alert and oriented x3 she is in no apparent distress he is pleasant and cooperative.  Examination of the right upper extremity compartments are soft and warm.  Skin is intact.  There is no signs symptoms of DVT or infection.  She does have a dorsal ganglion cyst along the dorsal aspect, he has a proximally 3 x 2 cm.  Minimally tender, no signs of infection.  She is able make a full fist full extension, neurovascular intact distally.    Images:  X-rays three views right wrist demonstrate no obvious fracture or dislocation. Images Reviewed and discussed with patient.    Assessment:  1. Right wrist pain  - X-Ray Wrist Complete Right; Future    2. Ganglion cyst of dorsum of right wrist  - Ambulatory referral/consult to Orthopedics        Plan:  At this time we discussed her physical exam and x-ray findings.  We have discussed her recurrent cyst.  We have discussed observation aspiration injection as well surgical intervention including excision.  We have discussed a recurrence rate.   Patient is interested in possible surgery, she will call or return sooner when she is ready to proceed.    Follow UP: No follow-ups on file.

## 2025-06-19 DIAGNOSIS — R00.0 TACHYCARDIA: ICD-10-CM

## 2025-06-19 DIAGNOSIS — Z00.00 WELLNESS EXAMINATION: Primary | ICD-10-CM

## 2025-06-19 DIAGNOSIS — Z11.4 ENCOUNTER FOR SCREENING FOR HIV: ICD-10-CM

## 2025-06-19 DIAGNOSIS — Z11.59 NEED FOR HEPATITIS C SCREENING TEST: ICD-10-CM

## 2025-07-24 ENCOUNTER — LAB VISIT (OUTPATIENT)
Dept: LAB | Facility: HOSPITAL | Age: 35
End: 2025-07-24
Attending: FAMILY MEDICINE
Payer: COMMERCIAL

## 2025-07-24 DIAGNOSIS — Z11.59 NEED FOR HEPATITIS C SCREENING TEST: ICD-10-CM

## 2025-07-24 DIAGNOSIS — Z00.00 WELLNESS EXAMINATION: ICD-10-CM

## 2025-07-24 DIAGNOSIS — R00.0 TACHYCARDIA: ICD-10-CM

## 2025-07-24 LAB
ALBUMIN SERPL-MCNC: 4.2 G/DL (ref 3.5–5)
ALBUMIN/GLOB SERPL: 1.3 RATIO (ref 1.1–2)
ALP SERPL-CCNC: 49 UNIT/L (ref 40–150)
ALT SERPL-CCNC: 13 UNIT/L (ref 0–55)
ANION GAP SERPL CALC-SCNC: 7 MEQ/L
AST SERPL-CCNC: 18 UNIT/L (ref 11–45)
BASOPHILS # BLD AUTO: 0.02 X10(3)/MCL
BASOPHILS NFR BLD AUTO: 0.3 %
BILIRUB SERPL-MCNC: 0.9 MG/DL
BUN SERPL-MCNC: 10 MG/DL (ref 7–18.7)
CALCIUM SERPL-MCNC: 9.4 MG/DL (ref 8.4–10.2)
CHLORIDE SERPL-SCNC: 105 MMOL/L (ref 98–107)
CHOLEST SERPL-MCNC: 178 MG/DL
CHOLEST/HDLC SERPL: 2 {RATIO} (ref 0–5)
CO2 SERPL-SCNC: 28 MMOL/L (ref 22–29)
CREAT SERPL-MCNC: 0.68 MG/DL (ref 0.55–1.02)
CREAT/UREA NIT SERPL: 15
EOSINOPHIL # BLD AUTO: 0.07 X10(3)/MCL (ref 0–0.9)
EOSINOPHIL NFR BLD AUTO: 1.1 %
ERYTHROCYTE [DISTWIDTH] IN BLOOD BY AUTOMATED COUNT: 11.6 % (ref 11.5–17)
GFR SERPLBLD CREATININE-BSD FMLA CKD-EPI: >60 ML/MIN/1.73/M2
GLOBULIN SER-MCNC: 3.2 GM/DL (ref 2.4–3.5)
GLUCOSE SERPL-MCNC: 87 MG/DL (ref 74–100)
HCT VFR BLD AUTO: 38.3 % (ref 37–47)
HCV AB SERPL QL IA: NONREACTIVE
HDLC SERPL-MCNC: 81 MG/DL (ref 35–60)
HGB BLD-MCNC: 13.2 G/DL (ref 12–16)
IMM GRANULOCYTES # BLD AUTO: 0.01 X10(3)/MCL (ref 0–0.04)
IMM GRANULOCYTES NFR BLD AUTO: 0.2 %
LDLC SERPL CALC-MCNC: 89 MG/DL (ref 50–140)
LYMPHOCYTES # BLD AUTO: 1.42 X10(3)/MCL (ref 0.6–4.6)
LYMPHOCYTES NFR BLD AUTO: 21.5 %
MCH RBC QN AUTO: 32.8 PG (ref 27–31)
MCHC RBC AUTO-ENTMCNC: 34.5 G/DL (ref 33–36)
MCV RBC AUTO: 95 FL (ref 80–94)
MONOCYTES # BLD AUTO: 0.35 X10(3)/MCL (ref 0.1–1.3)
MONOCYTES NFR BLD AUTO: 5.3 %
NEUTROPHILS # BLD AUTO: 4.74 X10(3)/MCL (ref 2.1–9.2)
NEUTROPHILS NFR BLD AUTO: 71.6 %
NRBC BLD AUTO-RTO: 0 %
PLATELET # BLD AUTO: 258 X10(3)/MCL (ref 130–400)
PMV BLD AUTO: 9.4 FL (ref 7.4–10.4)
POTASSIUM SERPL-SCNC: 4.2 MMOL/L (ref 3.5–5.1)
PROT SERPL-MCNC: 7.4 GM/DL (ref 6.4–8.3)
RBC # BLD AUTO: 4.03 X10(6)/MCL (ref 4.2–5.4)
SODIUM SERPL-SCNC: 140 MMOL/L (ref 136–145)
TRIGL SERPL-MCNC: 39 MG/DL (ref 37–140)
VLDLC SERPL CALC-MCNC: 8 MG/DL
WBC # BLD AUTO: 6.61 X10(3)/MCL (ref 4.5–11.5)

## 2025-07-24 PROCEDURE — 80053 COMPREHEN METABOLIC PANEL: CPT

## 2025-07-24 PROCEDURE — 80061 LIPID PANEL: CPT

## 2025-07-24 PROCEDURE — 85025 COMPLETE CBC W/AUTO DIFF WBC: CPT

## 2025-07-24 PROCEDURE — 36415 COLL VENOUS BLD VENIPUNCTURE: CPT

## 2025-07-24 PROCEDURE — 86803 HEPATITIS C AB TEST: CPT

## 2025-07-28 ENCOUNTER — OFFICE VISIT (OUTPATIENT)
Dept: FAMILY MEDICINE | Facility: CLINIC | Age: 35
End: 2025-07-28
Payer: COMMERCIAL

## 2025-07-28 VITALS
RESPIRATION RATE: 20 BRPM | HEIGHT: 60 IN | SYSTOLIC BLOOD PRESSURE: 110 MMHG | DIASTOLIC BLOOD PRESSURE: 73 MMHG | WEIGHT: 119 LBS | OXYGEN SATURATION: 99 % | HEART RATE: 74 BPM | TEMPERATURE: 98 F | BODY MASS INDEX: 23.36 KG/M2

## 2025-07-28 DIAGNOSIS — F41.1 GENERALIZED ANXIETY DISORDER: ICD-10-CM

## 2025-07-28 DIAGNOSIS — Z00.00 ROUTINE GENERAL MEDICAL EXAMINATION AT A HEALTH CARE FACILITY: Primary | ICD-10-CM

## 2025-07-28 PROBLEM — F10.20 ALCOHOLISM: Status: RESOLVED | Noted: 2022-06-01 | Resolved: 2025-07-28

## 2025-07-28 PROCEDURE — 1160F RVW MEDS BY RX/DR IN RCRD: CPT | Mod: CPTII,,, | Performed by: FAMILY MEDICINE

## 2025-07-28 PROCEDURE — 1159F MED LIST DOCD IN RCRD: CPT | Mod: CPTII,,, | Performed by: FAMILY MEDICINE

## 2025-07-28 PROCEDURE — 3074F SYST BP LT 130 MM HG: CPT | Mod: CPTII,,, | Performed by: FAMILY MEDICINE

## 2025-07-28 PROCEDURE — 3078F DIAST BP <80 MM HG: CPT | Mod: CPTII,,, | Performed by: FAMILY MEDICINE

## 2025-07-28 PROCEDURE — 3008F BODY MASS INDEX DOCD: CPT | Mod: CPTII,,, | Performed by: FAMILY MEDICINE

## 2025-07-28 PROCEDURE — 99395 PREV VISIT EST AGE 18-39: CPT | Mod: ,,, | Performed by: FAMILY MEDICINE

## 2025-07-28 NOTE — PROGRESS NOTES
Patient ID: [id number]     Chief Complaint: Annual Exam    HPI:     Windy Galarza is a 34 y.o. female here today for an annual wellness visit. Reviewed and discussed lab results. Overall she feels well. No other complaints today.     History of Present Illness               -------------------------------------    Alcoholism    Patient is drinking heavier lately. I have advised patient that she quit drinking alcohol or at least cut back considerably. She understood      Anxiety disorder, unspecified    Eating disorder, unspecified    GERD (gastroesophageal reflux disease)    IBS (irritable bowel syndrome)    Insomnia    Lentigines    Major depression, recurrent    Tachycardia        Past Surgical History:   Procedure Laterality Date    DILATION AND CURETTAGE OF UTERUS  01/28/2021    WISDOM TOOTH EXTRACTION  2016       Review of patient's allergies indicates:  No Known Allergies    Outpatient Medications Marked as Taking for the 7/28/25 encounter (Office Visit) with Jann Grajeda, DO   Medication Sig Dispense Refill    hydrOXYzine HCL (ATARAX) 25 MG tablet Take 1 tablet (25 mg total) by mouth 4 (four) times daily as needed for Anxiety. 30 tablet 1    metoprolol succinate (TOPROL-XL) 25 MG 24 hr tablet Take 1 tablet (25 mg total) by mouth once daily. 30 tablet 5    valACYclovir (VALTREX) 1000 MG tablet Take 1,000 mg by mouth once daily.         Social History[1]     Family History   Problem Relation Name Age of Onset    Heart disease Mother Mom     Diabetes Mellitus Mother Mom     Hypertension Mother Mom     Tuberculosis Mother Mom     Alcohol abuse Mother Mom     Depression Mother Mom     Mental illness Mother Mom     Miscarriages / Stillbirths Mother Mom     Thyroid disease Sister Sister     Alcohol abuse Sister Sister     Depression Sister Sister     Heart attack Maternal Grandmother Charissa Orourke     Hypertension Maternal Grandmother Charissa Orourke     Stroke Maternal Grandmother Charissa Orourke     Coronary artery  disease Maternal Grandmother Maw Barras     Alcohol abuse Maternal Grandmother Maw Barras     Cancer Maternal Grandmother Maw Barras     Depression Maternal Grandmother Maw Barras     Heart disease Maternal Grandfather Paw Barras     Hypertension Maternal Grandfather Paw Barras     Alcohol abuse Maternal Grandfather Paw Barras     Depression Maternal Grandfather Paw Barras     Hypertension Paternal Grandmother Maw Maw     Alcohol abuse Paternal Grandmother Maw Maw     Depression Paternal Grandmother Maw Maw     Heart disease Paternal Grandmother Maw Maw     Dementia Paternal Grandfather Benson     Hypertension Paternal Grandfather Paw Paw     Depression Paternal Grandfather Paw Paw     Hearing loss Paternal Grandfather Benson     Vision loss Paternal Grandfather Benson     Alcohol abuse Father Dad     Depression Father Dad         Patient Care Team:  Jann Grajeda DO as PCP - General (Family Medicine)  Kallie Ambrose MD as Consulting Physician (Obstetrics and Gynecology)  GARO Zhou Jr., MD as Consulting Physician (Gynecology)  Yaakov Long MD as Consulting Physician (Cardiology)  Sage Kong MD as Consulting Physician (Dermatology)       Subjective:     ROS    See HPI for details  All Other ROS: Negative except as stated in HPI.       Objective:     /73 (BP Location: Left arm, Patient Position: Sitting)   Pulse 74   Temp 98.1 °F (36.7 °C)   Resp 20   Ht 5' (1.524 m)   Wt 54 kg (119 lb)   LMP 07/07/2025 (Exact Date)   SpO2 99%   BMI 23.24 kg/m²     Physical Exam  Vitals reviewed.   Constitutional:       General: She is not in acute distress.     Appearance: Normal appearance.   Cardiovascular:      Rate and Rhythm: Normal rate and regular rhythm.      Heart sounds: No murmur heard.     No friction rub. No gallop.   Pulmonary:      Effort: No respiratory distress.      Breath sounds: No wheezing, rhonchi or rales.   Musculoskeletal:         General: No swelling, tenderness  or deformity.      Right lower leg: No edema.      Left lower leg: No edema.   Skin:     General: Skin is warm and dry.      Findings: No lesion or rash.   Neurological:      General: No focal deficit present.      Mental Status: She is alert.   Psychiatric:         Mood and Affect: Mood normal.         Assessment:       ICD-10-CM ICD-9-CM   1. Routine general medical examination at a health care facility  Z00.00 V70.0   2. Generalized anxiety disorder  F41.1 300.02        Plan:     Assessment & Plan               Health Maintenance Topics with due status: Not Due       Topic Last Completion Date    Cervical Cancer Screening 02/10/2021    Influenza Vaccine Not Due    RSV Vaccine (Age 60+ and Pregnant patients) Not Due      Vaccinations -   Immunization History   Administered Date(s) Administered    Hepatitis A, Adult 10/03/2005    Hepatitis B, Pediatric/Adolescent 07/24/2000, 09/11/2000, 01/29/2001    HiB PRP-T 11/01/1990    MMR 04/05/1990, 08/19/1993    Meningococcal Conjugate (MCV4P) 03/06/2006    OPV 03/09/1989, 06/01/1989, 04/05/1990, 08/19/1993    Td (ADULT) 07/24/2000        1. Routine general medical examination at a health care facility    2. Generalized anxiety disorder  -hydroxyzine 25mg QID PRN     Medication List with Changes/Refills   Current Medications    HYDROXYZINE HCL (ATARAX) 25 MG TABLET    Take 1 tablet (25 mg total) by mouth 4 (four) times daily as needed for Anxiety.       Start Date: 12/17/2024End Date: --    METOPROLOL SUCCINATE (TOPROL-XL) 25 MG 24 HR TABLET    Take 1 tablet (25 mg total) by mouth once daily.       Start Date: 1/27/2025 End Date: 7/28/2025    VALACYCLOVIR (VALTREX) 1000 MG TABLET    Take 1,000 mg by mouth once daily.       Start Date: 8/8/2022  End Date: --        The patient's Health Maintenance was reviewed and the following appears to be due at this time:   Health Maintenance Due   Topic Date Due    Pneumococcal Vaccines (Age 0-49) (1 of 2 - PCV) Never done    TETANUS  VACCINE  07/24/2010     This note was generated with the assistance of ambient listening technology. Verbal consent was obtained by the patient and accompanying visitor(s) for the recording of patient appointment to facilitate this note. I attest to having reviewed and edited the generated note for accuracy, though some syntax or spelling errors may persist. Please contact the author of this note for any clarification.       Follow up in about 6 months (around 1/28/2026) for Follow up anxiety. In addition to their scheduled follow up, the patient has also been instructed to follow up on as needed basis.            [1]   Social History  Socioeconomic History    Marital status: Single   Occupational History    Occupation:  - [employer]   Tobacco Use    Smoking status: Some Days     Current packs/day: 0.25     Types: Cigarettes    Smokeless tobacco: Never   Substance and Sexual Activity    Alcohol use: Yes     Alcohol/week: 3.0 standard drinks of alcohol     Types: 3 Cans of beer per week    Drug use: Never    Sexual activity: Yes     Partners: Male     Birth control/protection: Abstinence, None     Social Drivers of Health     Financial Resource Strain: Low Risk  (7/21/2025)    Overall Financial Resource Strain (CARDIA)     Difficulty of Paying Living Expenses: Not very hard   Food Insecurity: No Food Insecurity (7/21/2025)    Hunger Vital Sign     Worried About Running Out of Food in the Last Year: Never true     Ran Out of Food in the Last Year: Never true   Transportation Needs: No Transportation Needs (7/21/2025)    PRAPARE - Transportation     Lack of Transportation (Medical): No     Lack of Transportation (Non-Medical): No   Physical Activity: Insufficiently Active (7/21/2025)    Exercise Vital Sign     Days of Exercise per Week: 3 days     Minutes of Exercise per Session: 30 min   Stress: Stress Concern Present (7/21/2025)    [employer] - Occupational Stress  Questionnaire     Feeling of Stress : Rather much   Housing Stability: Low Risk  (7/21/2025)    Housing Stability Vital Sign     Unable to Pay for Housing in the Last Year: No     Number of Times Moved in the Last Year: 0     Homeless in the Last Year: No

## 2025-09-02 DIAGNOSIS — T23.241A PARTIAL THICKNESS BURN OF MULTIPLE DIGITS OF RIGHT HAND INCLUDING PARTIAL THICKNESS BURN OF THUMB, INITIAL ENCOUNTER: Primary | ICD-10-CM

## 2025-09-02 RX ORDER — SILVER SULFADIAZINE 10 G/1000G
CREAM TOPICAL 2 TIMES DAILY
Qty: 50 G | Refills: 1 | Status: SHIPPED | OUTPATIENT
Start: 2025-09-02